# Patient Record
Sex: FEMALE | Race: BLACK OR AFRICAN AMERICAN | NOT HISPANIC OR LATINO | Employment: FULL TIME | URBAN - METROPOLITAN AREA
[De-identification: names, ages, dates, MRNs, and addresses within clinical notes are randomized per-mention and may not be internally consistent; named-entity substitution may affect disease eponyms.]

---

## 2018-07-18 ENCOUNTER — HOSPITAL ENCOUNTER (EMERGENCY)
Facility: HOSPITAL | Age: 28
Discharge: HOME | End: 2018-07-18
Attending: FAMILY MEDICINE
Payer: COMMERCIAL

## 2018-07-18 VITALS
WEIGHT: 253 LBS | OXYGEN SATURATION: 100 % | RESPIRATION RATE: 20 BRPM | SYSTOLIC BLOOD PRESSURE: 143 MMHG | TEMPERATURE: 98 F | DIASTOLIC BLOOD PRESSURE: 85 MMHG | HEIGHT: 65 IN | BODY MASS INDEX: 42.15 KG/M2 | HEART RATE: 62 BPM

## 2018-07-18 DIAGNOSIS — S69.90XA FINGER INJURY, INITIAL ENCOUNTER: Primary | ICD-10-CM

## 2018-07-18 DIAGNOSIS — S60.10XA SUBUNGUAL HEMATOMA OF FINGER, INITIAL ENCOUNTER: ICD-10-CM

## 2018-07-18 LAB — B-HCG UR QL: NEGATIVE

## 2018-07-18 PROCEDURE — 29130 APPL FINGER SPLINT STATIC: CPT | Mod: 59,F3 | Performed by: PHYSICIAN ASSISTANT

## 2018-07-18 PROCEDURE — 99284 EMERGENCY DEPT VISIT MOD MDM: CPT | Mod: 25 | Performed by: PHYSICIAN ASSISTANT

## 2018-07-18 PROCEDURE — 81025 URINE PREGNANCY TEST: CPT

## 2018-07-18 PROCEDURE — 96372 THER/PROPH/DIAG INJ SC/IM: CPT | Performed by: PHYSICIAN ASSISTANT

## 2018-07-18 PROCEDURE — 11740 EVACUATION SUBUNGUAL HMTMA: CPT | Mod: 59,F3 | Performed by: PHYSICIAN ASSISTANT

## 2018-07-18 RX ORDER — KETOROLAC TROMETHAMINE 30 MG/ML
15 INJECTION, SOLUTION INTRAMUSCULAR; INTRAVENOUS
Status: DISCONTINUED | OUTPATIENT
Start: 2018-07-18 | End: 2018-07-18 | Stop reason: HOSPADM

## 2018-07-18 NOTE — DISCHARGE INSTRUCTIONS
X-ray did not reveal any evidence of fracture or dislocation.  You are advised to follow-up with her primary care provider for reevaluation within 2 days.  You're instructed to return to the emergency department immediately for any new or worsening symptoms.

## 2018-07-19 NOTE — ED PROVIDER NOTES
Encounter Date: 7/18/2018       History     Chief Complaint   Patient presents with    Finger Injury     Pt reports slammed left ring finger in car door approx 45 min PTA, + bruising noted under nail bed, + swelling and pt c/o throbbing pain.     28-year-old female presents emergency department for evaluation of acute left ring finger pain status post injury.  She reports that approximately an hour and a half prior to arrival she slammed her left ring finger in her car door.  She states that she had to open the door in order to remove the finger.  She reports pain mild swelling noted to the tip of the finger.  She denies any numbness, tingling, weakness or decreased range of motion.  She reports  some bruising underneath her fingernail, but denies any bleeding from the nail.  No treatment was attempted prior to arrival.           Review of patient's allergies indicates:  No Known Allergies  History reviewed. No pertinent past medical history.  Past Surgical History:   Procedure Laterality Date    GASTRIC BYPASS       History reviewed. No pertinent family history.  Social History   Substance Use Topics    Smoking status: Never Smoker    Smokeless tobacco: Never Used    Alcohol use No     Review of Systems   Constitutional: Negative for activity change, appetite change and fever.   HENT: Negative for congestion, ear discharge, nosebleeds, postnasal drip, rhinorrhea, sinus pressure, sneezing, trouble swallowing and voice change.    Eyes: Negative for photophobia and visual disturbance.   Respiratory: Negative for cough and shortness of breath.    Cardiovascular: Negative for chest pain.   Gastrointestinal: Negative for abdominal pain, constipation, diarrhea, nausea and vomiting.   Genitourinary: Negative for dysuria and hematuria.   Musculoskeletal: Positive for joint swelling. Negative for back pain, gait problem and neck pain.   Skin: Negative for rash.   Neurological: Negative for dizziness, syncope, weakness,  numbness and headaches.       Physical Exam     Initial Vitals [07/18/18 1325]   BP Pulse Resp Temp SpO2   139/86 63 18 97.8 °F (36.6 °C) 99 %      MAP       --         Physical Exam    Nursing note and vitals reviewed.  Constitutional: She appears well-developed and well-nourished. She is not diaphoretic. No distress.   HENT:   Head: Normocephalic and atraumatic.   Right Ear: External ear normal.   Left Ear: External ear normal.   Nose: Nose normal.   Mouth/Throat: Oropharynx is clear and moist.   Eyes: Conjunctivae and EOM are normal. Pupils are equal, round, and reactive to light.   Neck: Normal range of motion. Neck supple.   Cardiovascular: Normal rate, regular rhythm and normal heart sounds. Exam reveals no gallop and no friction rub.    No murmur heard.  Pulmonary/Chest: Breath sounds normal. No respiratory distress. She has no wheezes. She has no rhonchi. She has no rales. She exhibits no tenderness.   Abdominal: Soft. Bowel sounds are normal. There is no tenderness.   Musculoskeletal:        Hands:  Lymphadenopathy:     She has no cervical adenopathy.   Neurological: She is alert and oriented to person, place, and time. No cranial nerve deficit.   Skin: Skin is warm and dry.   Psychiatric: She has a normal mood and affect.         ED Course   Procedures  Labs Reviewed   PREGNANCY TEST, URINE RAPID          Imaging Results          X-Ray Hand 3 view Left (Final result)  Result time 07/18/18 14:47:52    Final result by Chiki Mistry MD (07/18/18 14:47:52)                 Impression:      No acute fracture or dislocation.      Electronically signed by: Chiki Mistry MD  Date:    07/18/2018  Time:    14:47             Narrative:    EXAMINATION:  XR HAND COMPLETE 3 VIEW LEFT    CLINICAL HISTORY:  XR HAND COMPLETE 3 VIEW LEFT    FINDINGS:  Three views of the left hand were obtained.    No evidence of acute fracture or dislocation.  Bony mineralization is normal.  Soft tissues are unremarkable.                                  Medical Decision Making:   Initial Assessment:   28-year-old female presents for evaluation of finger injury.  Physical exam reveals a nontoxic-appearing female in no acute distress.  Patient is afebrile vital signs within normal limits.  Neurological exam reveals an alert and oriented patient.  No evidence of head injury noted.  Examination of the left upper extremity reveals Tenderness to palpation, mild edema and ecchymosis noted to the distal aspect of the left ring finger.  Small subungual hematoma noted.  Full range of motion, sensation and peripheral pulses intact in upper extremities bilaterally.  Differential Diagnosis:   X-ray order to assess possible osseous injury including fracture dislocation.  ED Management:  UPT negative.  Patient given Toradol for pain control.  X-ray reveals no evidence of acute fracture dislocation.  Trephination of the nail was performed in the emergency department using a cautery and relief of subungual  hematoma.   Patient tolerated the procedure well without complication.  the risks of nail deformity were discussed prior to trephination.  Finger was splinted for comfort.  Discussed these findings only for the patient who verbalizes understanding and agreement with course of treatment.  Instructed the patient to follow-up with her primary care provider for reevaluation and to return to the emergency department immediately for any new or worsening symptoms.                        Clinical Impression:   The primary encounter diagnosis was Finger injury, initial encounter. A diagnosis of Subungual hematoma of finger, initial encounter was also pertinent to this visit.                             Amy Bond PA-C  07/19/18 0574

## 2018-09-25 ENCOUNTER — HOSPITAL ENCOUNTER (EMERGENCY)
Facility: HOSPITAL | Age: 28
Discharge: HOME | End: 2018-09-25
Attending: EMERGENCY MEDICINE
Payer: COMMERCIAL

## 2018-09-25 VITALS
TEMPERATURE: 98 F | OXYGEN SATURATION: 100 % | WEIGHT: 220 LBS | HEART RATE: 70 BPM | HEIGHT: 65 IN | DIASTOLIC BLOOD PRESSURE: 80 MMHG | BODY MASS INDEX: 36.65 KG/M2 | RESPIRATION RATE: 18 BRPM | SYSTOLIC BLOOD PRESSURE: 125 MMHG

## 2018-09-25 DIAGNOSIS — L02.91 ABSCESS: Primary | ICD-10-CM

## 2018-09-25 LAB
B-HCG UR QL: NEGATIVE
CTP QC/QA: YES

## 2018-09-25 PROCEDURE — 81025 URINE PREGNANCY TEST: CPT | Performed by: PHYSICIAN ASSISTANT

## 2018-09-25 PROCEDURE — 25000003 PHARM REV CODE 250: Performed by: PHYSICIAN ASSISTANT

## 2018-09-25 PROCEDURE — 12002 RPR S/N/AX/GEN/TRNK2.6-7.5CM: CPT

## 2018-09-25 PROCEDURE — 99283 EMERGENCY DEPT VISIT LOW MDM: CPT | Mod: 25

## 2018-09-25 PROCEDURE — 10061 I&D ABSCESS COMP/MULTIPLE: CPT

## 2018-09-25 RX ORDER — SULFAMETHOXAZOLE AND TRIMETHOPRIM 800; 160 MG/1; MG/1
1 TABLET ORAL 2 TIMES DAILY
Qty: 14 TABLET | Refills: 0 | Status: ON HOLD | OUTPATIENT
Start: 2018-09-25 | End: 2018-10-02 | Stop reason: HOSPADM

## 2018-09-25 RX ORDER — HYDROCODONE BITARTRATE AND ACETAMINOPHEN 5; 325 MG/1; MG/1
1 TABLET ORAL
Status: COMPLETED | OUTPATIENT
Start: 2018-09-25 | End: 2018-09-25

## 2018-09-25 RX ORDER — LIDOCAINE HYDROCHLORIDE 10 MG/ML
10 INJECTION INFILTRATION; PERINEURAL
Status: COMPLETED | OUTPATIENT
Start: 2018-09-25 | End: 2018-09-25

## 2018-09-25 RX ORDER — HYDROCODONE BITARTRATE AND ACETAMINOPHEN 5; 325 MG/1; MG/1
1 TABLET ORAL EVERY 4 HOURS PRN
Qty: 6 TABLET | Refills: 0 | Status: SHIPPED | OUTPATIENT
Start: 2018-09-25 | End: 2018-09-27 | Stop reason: SDUPTHER

## 2018-09-25 RX ADMIN — LIDOCAINE HYDROCHLORIDE 10 ML: 10 INJECTION, SOLUTION INFILTRATION; PERINEURAL at 10:09

## 2018-09-25 RX ADMIN — HYDROCODONE BITARTRATE AND ACETAMINOPHEN 1 TABLET: 5; 325 TABLET ORAL at 10:09

## 2018-09-25 RX ADMIN — Medication: at 10:09

## 2018-09-25 NOTE — ED PROVIDER NOTES
Encounter Date: 9/25/2018       History     Chief Complaint   Patient presents with    Abscess     c/o bilateral axillary abscesses since yesterday with pain.     Eboni Betancourt, a 28 y.o. female that presents to the ED for evaluation for swelling and pain to bilateral axillary areas.  Patient states that she no drainage from the site.  Denies any fevers.  She states that she noticed some discomfort yesterday and that she has had an increase of pain since the onset of symptoms. She states she has got a remote history abscesses to the site about 10 years ago.  Treatments tried include warm compresses with little improvement of her pain.          The history is provided by the patient.     Review of patient's allergies indicates:  No Known Allergies  History reviewed. No pertinent past medical history.  Past Surgical History:   Procedure Laterality Date    GASTRIC BYPASS       History reviewed. No pertinent family history.  Social History     Tobacco Use    Smoking status: Never Smoker    Smokeless tobacco: Never Used   Substance Use Topics    Alcohol use: No    Drug use: No     Review of Systems   Constitutional: Negative for fever.   Gastrointestinal: Negative for nausea and vomiting.   Skin: Positive for color change.   Allergic/Immunologic: Negative for immunocompromised state.   Psychiatric/Behavioral: Negative for agitation.   All other systems reviewed and are negative.      Physical Exam     Initial Vitals [09/25/18 0924]   BP Pulse Resp Temp SpO2   132/82 74 16 97.5 °F (36.4 °C) 99 %      MAP       --         Physical Exam    Nursing note and vitals reviewed.  Constitutional: She appears well-developed and well-nourished. She is not diaphoretic. No distress.   HENT:   Head: Normocephalic and atraumatic.   Right Ear: External ear normal.   Left Ear: External ear normal.   Nose: Nose normal.   Mouth/Throat: Oropharynx is clear and moist.   Eyes: Conjunctivae and EOM are normal.   Neck: Normal range of  motion.   Cardiovascular: Normal rate and regular rhythm.   Pulmonary/Chest: Breath sounds normal. No respiratory distress. She has no wheezes. She has no rhonchi. She has no rales.   Musculoskeletal: Normal range of motion.   Neurological: She is alert and oriented to person, place, and time.   Skin: Skin is warm and dry. Capillary refill takes less than 2 seconds. Abscess noted.   1 cm x 1 cm area of induration to left mid axilla     2 cm x 2 cm area of induration to right lower axilla    1 cm x 1 cm area of induration to right upper axilla    Psychiatric: She has a normal mood and affect. Thought content normal.         ED Course   I & D - Incision and Drainage  Date/Time: 9/25/2018 2:58 PM  Location procedure was performed: [facility] EMERGENCY DEPARTMENT  Performed by: Cait Landis PA-C  Authorized by: Marlyn Ramos MD   Consent Done: Yes  Consent: Verbal consent obtained.  Risks and benefits: risks, benefits and alternatives were discussed  Consent given by: patient  Patient identity confirmed: verbally with patient  Type: abscess  Body area: upper extremity  Location details: right arm  Anesthesia: local infiltration    Anesthesia:  Local Anesthetic: LET (lido,epi,tetracaine) and lidocaine 1% without epinephrine  Anesthetic total: 2 mL  Patient sedated: no  Risk factor: underlying major vessel  Scalpel size: 11  Incision type: single straight  Complexity: simple  Drainage: pus  Drainage amount: scant  Wound treatment: incision,  wound left open,  deloculation and  drainage  Packing material: 1/4 in gauze  Complications: No  Specimens: No  Implants: No  Patient tolerance: Patient tolerated the procedure well with no immediate complications    I & D - Incision and Drainage  Date/Time: 9/25/2018 3:00 PM  Location procedure was performed: [facility] EMERGENCY DEPARTMENT  Performed by: Cait Landis PA-C  Authorized by: Marlyn Ramos MD   Consent Done: Yes  Consent: Verbal consent obtained.  Risks and  benefits: risks, benefits and alternatives were discussed  Consent given by: patient  Patient identity confirmed: verbally with patient  Type: abscess  Body area: upper extremity  Location details: right arm  Anesthesia: local infiltration    Anesthesia:  Local Anesthetic: lidocaine 1% with epinephrine and LET (lido,epi,tetracaine)  Anesthetic total: 2 mL  Scalpel size: 11  Incision type: single straight  Complexity: simple  Drainage: pus  Drainage amount: scant  Wound treatment: incision,  deloculation,  wound left open,  wound packed and  drainage  Packing material: 1/4 in gauze  Complications: No  Specimens: No  Implants: No  Patient tolerance: Patient tolerated the procedure well with no immediate complications    I & D - Incision and Drainage  Date/Time: 9/25/2018 3:01 PM  Location procedure was performed: [facility] EMERGENCY DEPARTMENT  Performed by: Cait Landis PA-C  Authorized by: Marlyn Ramos MD   Consent Done: Yes  Consent: Verbal consent obtained.  Risks and benefits: risks, benefits and alternatives were discussed  Consent given by: patient  Patient identity confirmed: verbally with patient  Type: abscess  Body area: upper extremity  Location details: left arm  Anesthesia: local infiltration    Anesthesia:  Local Anesthetic: lidocaine 1% without epinephrine and LET (lido,epi,tetracaine)  Anesthetic total: 2 mL  Patient sedated: no  Scalpel size: 11  Incision type: single straight  Complexity: simple  Drainage: pus  Drainage amount: scant  Wound treatment: incision,  wound left open,  deloculation,  drainage and  wound packed  Packing material: 1/4 in gauze  Complications: No  Specimens: No  Implants: No  Patient tolerance: Patient tolerated the procedure well with no immediate complications        Labs Reviewed - No data to display       Imaging Results    None          Medical Decision Making:   Initial Assessment:   Increased swelling and pain to bilateral axilla  Differential Diagnosis:    Abscess, cellulitis, folliculitis, hidradenitis suppurativa  ED Management:  Patient presents to ED for evaluation of increased swelling and pain to bilateral axilla.  Multiple areas of induration with some fluctuance noted. Bedside ultrasound performed by Dr. Ramos showed multiple fluid collections.  For patient's comfort, Norco and LET were given.  The 3 areas were incised and drained with production of purulent material.  All areas were deloculated and packed.  Patient will be prescribed a course Bactrim and Norco.  She was instructed to return in 2 days to have packing replaced.  She was given strict return precautions that include increased redness, swelling, drainage or fever.  Case discussed with supervising physician who agrees with plan.                Attending Attestation:     Physician Attestation Statement for NP/PA:   I have conducted a face to face encounter with this patient in addition to the NP/PA, due to NP/PA Request    Other NP/PA Attestation Additions:      Medical Decision Making: Patient presents to ED for evaluation of increased swelling and pain to bilateral axilla.  Multiple areas of induration with some fluctuance noted. Bedside ultrasound performed by me showed multiple fluid collections.  For patient's comfort, Norco and LET were given.  The 3 areas were incised and drained with production of purulent material.  All areas were deloculated and packed.  Patient will be prescribed a course Bactrim and Norco.  She was instructed to return in 2 days to have packing replaced.  She was given strict return precautions that include increased redness, swelling, drainage or fever.  Case discussed with supervising physician who agrees with plan.                    Clinical Impression:   The encounter diagnosis was Abscess.                             Cait Landis PA-C  09/25/18 1506       Marlyn Ramos MD  09/26/18 3426

## 2018-09-25 NOTE — ED NOTES
Pt presents to the ED c/o 2 abscesses under the left arm and 1 under the right arm/onset yesterday. Pain rated 10/10

## 2018-09-25 NOTE — DISCHARGE INSTRUCTIONS
You have been prescribed Norco for pain. Please do not take this medication while working, drinking alcohol, swimming, or while driving/operating heavy machinery. This medication may cause drowsiness, impair judgment, and reduce physical capabilities.

## 2018-09-25 NOTE — ED NOTES
APPEARANCE: Alert, oriented and in no acute distress.  CARDIAC: Normal rate and rhythm and pulse   PERIPHERAL VASCULAR: peripheral pulses present. Normal cap refill. No edema. Warm to touch.    RESPIRATORY:Normal rate and effort, breath sounds clear bilaterally throughout chest. Respirations are equal and unlabored no obvious signs of distress.  GASTRO: soft, bowel sounds normal, no tenderness, no abdominal distention.  MUSC: Full ROM. No bony tenderness or soft tissue tenderness. No obvious deformity.  SKIN: Underarm abscesses/ +redness and tenderness to the right and left underarms/no drainage noted   NEURO: 5/5 strength major flexors/extensors bilaterally. Sensory intact to light touch bilaterally. Clinton coma scale: eyes open spontaneously-4, oriented & converses-5, obeys commands-6. No neurological abnormalities.   MENTAL STATUS: awake, alert and aware of environment.  EYE: PERRL, both eyes: pupils brisk and reactive to light. Normal size.  ENT: EARS: no obvious drainage. NOSE: no active bleeding.

## 2018-09-27 ENCOUNTER — HOSPITAL ENCOUNTER (EMERGENCY)
Facility: HOSPITAL | Age: 28
Discharge: HOME | End: 2018-09-27
Attending: EMERGENCY MEDICINE
Payer: COMMERCIAL

## 2018-09-27 VITALS
OXYGEN SATURATION: 100 % | HEIGHT: 65 IN | HEART RATE: 62 BPM | TEMPERATURE: 99 F | RESPIRATION RATE: 18 BRPM | SYSTOLIC BLOOD PRESSURE: 125 MMHG | DIASTOLIC BLOOD PRESSURE: 69 MMHG | BODY MASS INDEX: 37.49 KG/M2 | WEIGHT: 225 LBS

## 2018-09-27 DIAGNOSIS — L02.412 ABSCESS OF LEFT AXILLA: Primary | ICD-10-CM

## 2018-09-27 DIAGNOSIS — L02.411 ABSCESS OF RIGHT AXILLA: ICD-10-CM

## 2018-09-27 DIAGNOSIS — L02.01 ABSCESS OF CHIN: ICD-10-CM

## 2018-09-27 PROCEDURE — 99283 EMERGENCY DEPT VISIT LOW MDM: CPT | Mod: 25

## 2018-09-27 PROCEDURE — 25000003 PHARM REV CODE 250: Performed by: EMERGENCY MEDICINE

## 2018-09-27 PROCEDURE — 10061 I&D ABSCESS COMP/MULTIPLE: CPT

## 2018-09-27 RX ORDER — HYDROCODONE BITARTRATE AND ACETAMINOPHEN 5; 325 MG/1; MG/1
1 TABLET ORAL EVERY 4 HOURS PRN
Qty: 12 TABLET | Refills: 0 | Status: ON HOLD | OUTPATIENT
Start: 2018-09-27 | End: 2018-10-02 | Stop reason: SDUPTHER

## 2018-09-27 RX ORDER — LIDOCAINE HYDROCHLORIDE 10 MG/ML
10 INJECTION INFILTRATION; PERINEURAL
Status: COMPLETED | OUTPATIENT
Start: 2018-09-27 | End: 2018-09-27

## 2018-09-27 RX ORDER — CLINDAMYCIN HYDROCHLORIDE 150 MG/1
300 CAPSULE ORAL 4 TIMES DAILY
Qty: 56 CAPSULE | Refills: 0 | Status: ON HOLD | OUTPATIENT
Start: 2018-09-27 | End: 2018-10-02 | Stop reason: HOSPADM

## 2018-09-27 RX ADMIN — LIDOCAINE HYDROCHLORIDE 10 ML: 10 INJECTION, SOLUTION INFILTRATION; PERINEURAL at 11:09

## 2018-09-27 NOTE — ED PROVIDER NOTES
Encounter Date: 9/27/2018    SCRIBE #1 NOTE: I, Hi Crane, am scribing for, and in the presence of,  Dr. oMnica Goss. I have scribed the entire note.       History     Chief Complaint   Patient presents with    Wound Check     Patient is in ED for wound check up and packing removal.  Patient states that she feels like she has a new abscess to chin area.      Eboni Betancourt is a 28 y.o. female who  has no past medical history on file.    The patient presents to the ED for wound check and packing removal. Patient reports she had I&D of an abscess on left axilla and 2 on right axilla about 2 days ago. States there is a new abscess under her chin. She denies any other complaints.      The history is provided by the patient.     Review of patient's allergies indicates:  No Known Allergies  History reviewed. No pertinent past medical history.  Past Surgical History:   Procedure Laterality Date    GASTRIC BYPASS       History reviewed. No pertinent family history.  Social History     Tobacco Use    Smoking status: Never Smoker    Smokeless tobacco: Never Used   Substance Use Topics    Alcohol use: No    Drug use: No     Review of Systems   Constitutional: Negative for chills and fever.   HENT: Negative for congestion, rhinorrhea and sore throat.    Eyes: Negative for redness and visual disturbance.   Respiratory: Negative for cough, shortness of breath and wheezing.    Cardiovascular: Negative for chest pain and palpitations.   Gastrointestinal: Negative for abdominal pain, diarrhea, nausea and vomiting.   Genitourinary: Negative for dysuria and hematuria.   Musculoskeletal: Negative for back pain, myalgias and neck pain.   Skin: Positive for wound. Negative for rash.        Abscess under chin.   Neurological: Negative for dizziness, weakness and light-headedness.   Psychiatric/Behavioral: Negative for confusion.   All other systems reviewed and are negative.      Physical Exam     Initial Vitals [09/27/18 1044]    BP Pulse Resp Temp SpO2   (!) 159/101 78 16 98.2 °F (36.8 °C) 98 %      MAP       --         Physical Exam    Nursing note and vitals reviewed.  Constitutional: She appears well-developed and well-nourished. No distress.   HENT:   Head: Normocephalic and atraumatic.   Mouth/Throat: Oropharynx is clear and moist.   Eyes: Conjunctivae and EOM are normal. Pupils are equal, round, and reactive to light.   Neck: Normal range of motion. Neck supple.   Musculoskeletal: Normal range of motion. She exhibits no edema or tenderness.   Neurological: She is alert and oriented to person, place, and time.   Skin: Skin is warm and dry. Abscess (2 abscess to the left axilla that have packing and are draining large amount of exudate, a single abscess to the right axilla with packing in place and draining large amount of exudate, abscess to the chin that has not been I&D'ed.) noted.   Psychiatric: She has a normal mood and affect. Her behavior is normal. Judgment and thought content normal.         ED Course   I & D - Incision and Drainage  Date/Time: 9/27/2018 1:37 PM  Location procedure was performed: [facility] EMERGENCY DEPARTMENT  Performed by: Monica Goss MD  Authorized by: Monica Goss MD   Consent Done: Emergent Situation  Type: abscess  Body area: upper extremity (chinbilateral axilla)  Anesthesia: local infiltration    Anesthesia:  Local Anesthetic: lidocaine 1% without epinephrine  Anesthetic total: 5 mL  Patient sedated: no  Description of findings: 2 abscesses to the right axilla and 1 abscess to the left axilla: packing removed and the abscesses were opened larger,deloculated and repacked with iodoform gauze.   Scalpel size: 11  Incision type: single straight  Complexity: simple  Drainage: pus  Drainage amount: moderate  Wound treatment: incision,  wound left open,  deloculation,  expression of material and  wound packed  Packing material: 1/4 in iodoform gauze  Complications: No  Specimens: No  Implants: No  Patient  tolerance: Patient tolerated the procedure well with no immediate complications    I & D - Incision and Drainage  Date/Time: 9/27/2018 2:08 PM  Performed by: Monica Goss MD  Authorized by: Monica Goss MD   Type: abscess  Body area: head/neck (submandibular chin)  Anesthesia: local infiltration    Anesthesia:  Local Anesthetic: lidocaine 1% without epinephrine  Anesthetic total: 3 mL  Patient sedated: no  Scalpel size: 11  Incision type: single straight  Complexity: simple  Drainage: pus  Drainage amount: scant  Wound treatment: incision,  wound left open,  deloculation and  expression of material  Packing material: 1/4 in iodoform gauze  Complications: No  Specimens: No  Implants: No  Patient tolerance: Patient tolerated the procedure well with no immediate complications        Labs Reviewed - No data to display       Imaging Results    None                               Clinical Impression:     1. Abscess of left axilla    2. Abscess of right axilla    3. Abscess of chin                  IMonica, personally performed the services described in this documentation. All medical record entries made by the scribe were at my direction and in my presence.  I have reviewed the chart and agree that the record reflects my personal performance and is accurate and complete. Monica Goss M.D. 2:09 PM09/27/2018                 Monica Goss MD  09/27/18 1404

## 2018-09-27 NOTE — ED NOTES
"Patient identifiers verified and correct for Eboni Betancourt.  Was in bed states," I was seen two days ago for two abscesses left axilla, one right. All were lanced and packed. I am here for removal of the packing and have the new one on my face opened. Dry clean dressing to areas previously mentioned. Rates the pain 9/10. Does have hair to right chin with swelling noted.    LOC: The patient is awake, alert and aware of environment with an appropriate affect, the patient is oriented x 3 and speaking appropriately.  APPEARANCE: Patient resting comfortably and in no acute distress, patient is clean and well groomed, patient's clothing is properly fastened.  SKIN: The skin is warm and dry, color consistent with ethnicity, patient has normal skin turgor and moist mucus membranes, skin intact, no breakdown or bruising noted. Swelling and irritated area right chin.  MUSCULOSKELETAL: Patient moving all extremities spontaneously, no obvious swelling or deformities noted. Arms ROM decreased due to pain.  RESPIRATORY: Airway is open and patent, respirations are spontaneous, patient has a normal effort and rate, no accessory muscle use noted, bilateral breath sounds clear  CARDIAC: Patient has a normal rate and regular rhythm, no periphreal edema noted, capillary refill < 3 seconds.  ABDOMEN: Soft and non tender to palpation, no distention noted, normoactive bowel sounds present in all four quadrants.  NEUROLOGIC: PERRL, 36mm bilaterally, eyes open spontaneously, behavior appropriate to situation, follows commands, facial expression symmetrical, bilateral hand grasp equal and even, purposeful motor response noted, normal sensation in all extremities when touched with a finger.    "

## 2018-09-30 ENCOUNTER — HOSPITAL ENCOUNTER (INPATIENT)
Facility: HOSPITAL | Age: 28
LOS: 2 days | Discharge: HOME | DRG: 603 | End: 2018-10-02
Attending: EMERGENCY MEDICINE | Admitting: INTERNAL MEDICINE
Payer: COMMERCIAL

## 2018-09-30 DIAGNOSIS — L03.211 CELLULITIS DIFFUSE, FACE: Primary | ICD-10-CM

## 2018-09-30 PROBLEM — E28.2 PCOS (POLYCYSTIC OVARIAN SYNDROME): Status: ACTIVE | Noted: 2018-09-30

## 2018-09-30 PROBLEM — Z98.84 HX OF GASTRIC BYPASS: Status: ACTIVE | Noted: 2018-09-30

## 2018-09-30 LAB
B-HCG UR QL: NEGATIVE
BASOPHILS # BLD AUTO: 0.03 K/UL
BASOPHILS NFR BLD: 0.3 %
BUN SERPL-MCNC: 4 MG/DL (ref 6–30)
CHLORIDE SERPL-SCNC: 101 MMOL/L (ref 95–110)
CREAT SERPL-MCNC: 0.7 MG/DL (ref 0.5–1.4)
CTP QC/QA: YES
DIFFERENTIAL METHOD: NORMAL
EOSINOPHIL # BLD AUTO: 0.1 K/UL
EOSINOPHIL NFR BLD: 0.7 %
ERYTHROCYTE [DISTWIDTH] IN BLOOD BY AUTOMATED COUNT: 12.4 %
GLUCOSE SERPL-MCNC: 88 MG/DL (ref 70–110)
HCT VFR BLD AUTO: 40.3 %
HCT VFR BLD CALC: 40 %PCV (ref 36–54)
HGB BLD-MCNC: 13.6 G/DL
IMM GRANULOCYTES # BLD AUTO: 0.04 K/UL
IMM GRANULOCYTES NFR BLD AUTO: 0.4 %
LYMPHOCYTES # BLD AUTO: 2.5 K/UL
LYMPHOCYTES NFR BLD: 25.1 %
MCH RBC QN AUTO: 30.7 PG
MCHC RBC AUTO-ENTMCNC: 33.7 G/DL
MCV RBC AUTO: 91 FL
MONOCYTES # BLD AUTO: 1 K/UL
MONOCYTES NFR BLD: 9.6 %
NEUTROPHILS # BLD AUTO: 6.4 K/UL
NEUTROPHILS NFR BLD: 63.9 %
NRBC BLD-RTO: 0 /100 WBC
PLATELET # BLD AUTO: 262 K/UL
PMV BLD AUTO: 10.6 FL
POC IONIZED CALCIUM: 1.16 MMOL/L (ref 1.06–1.42)
POC TCO2 (MEASURED): 27 MMOL/L (ref 23–29)
POTASSIUM BLD-SCNC: 3.6 MMOL/L (ref 3.5–5.1)
RBC # BLD AUTO: 4.43 M/UL
SAMPLE: ABNORMAL
SODIUM BLD-SCNC: 140 MMOL/L (ref 136–145)
WBC # BLD AUTO: 10.07 K/UL

## 2018-09-30 PROCEDURE — 25000003 PHARM REV CODE 250: Performed by: INTERNAL MEDICINE

## 2018-09-30 PROCEDURE — 25000003 PHARM REV CODE 250: Performed by: NURSE PRACTITIONER

## 2018-09-30 PROCEDURE — 87077 CULTURE AEROBIC IDENTIFY: CPT

## 2018-09-30 PROCEDURE — 90715 TDAP VACCINE 7 YRS/> IM: CPT | Performed by: NURSE PRACTITIONER

## 2018-09-30 PROCEDURE — 87070 CULTURE OTHR SPECIMN AEROBIC: CPT

## 2018-09-30 PROCEDURE — 11000001 HC ACUTE MED/SURG PRIVATE ROOM

## 2018-09-30 PROCEDURE — 90471 IMMUNIZATION ADMIN: CPT | Performed by: NURSE PRACTITIONER

## 2018-09-30 PROCEDURE — 25000003 PHARM REV CODE 250: Performed by: EMERGENCY MEDICINE

## 2018-09-30 PROCEDURE — 10060 I&D ABSCESS SIMPLE/SINGLE: CPT | Mod: LT,,, | Performed by: EMERGENCY MEDICINE

## 2018-09-30 PROCEDURE — 99223 1ST HOSP IP/OBS HIGH 75: CPT | Mod: ,,, | Performed by: INTERNAL MEDICINE

## 2018-09-30 PROCEDURE — 10061 I&D ABSCESS COMP/MULTIPLE: CPT

## 2018-09-30 PROCEDURE — 85025 COMPLETE CBC W/AUTO DIFF WBC: CPT

## 2018-09-30 PROCEDURE — 96365 THER/PROPH/DIAG IV INF INIT: CPT

## 2018-09-30 PROCEDURE — 87040 BLOOD CULTURE FOR BACTERIA: CPT

## 2018-09-30 PROCEDURE — 99283 EMERGENCY DEPT VISIT LOW MDM: CPT | Mod: 25,,, | Performed by: EMERGENCY MEDICINE

## 2018-09-30 PROCEDURE — 0X953ZZ DRAINAGE OF LEFT AXILLA, PERCUTANEOUS APPROACH: ICD-10-PCS | Performed by: EMERGENCY MEDICINE

## 2018-09-30 PROCEDURE — 96366 THER/PROPH/DIAG IV INF ADDON: CPT

## 2018-09-30 PROCEDURE — 63600175 PHARM REV CODE 636 W HCPCS: Performed by: NURSE PRACTITIONER

## 2018-09-30 PROCEDURE — 87186 SC STD MICRODIL/AGAR DIL: CPT

## 2018-09-30 PROCEDURE — 25000003 PHARM REV CODE 250: Performed by: STUDENT IN AN ORGANIZED HEALTH CARE EDUCATION/TRAINING PROGRAM

## 2018-09-30 PROCEDURE — 81025 URINE PREGNANCY TEST: CPT | Performed by: EMERGENCY MEDICINE

## 2018-09-30 PROCEDURE — 99285 EMERGENCY DEPT VISIT HI MDM: CPT | Mod: 25

## 2018-09-30 RX ORDER — ACETAMINOPHEN 325 MG/1
650 TABLET ORAL EVERY 4 HOURS PRN
Status: DISCONTINUED | OUTPATIENT
Start: 2018-09-30 | End: 2018-10-03 | Stop reason: HOSPADM

## 2018-09-30 RX ORDER — VANCOMYCIN HCL IN 5 % DEXTROSE 1.5G/250ML
15 PLASTIC BAG, INJECTION (ML) INTRAVENOUS
Status: COMPLETED | OUTPATIENT
Start: 2018-09-30 | End: 2018-09-30

## 2018-09-30 RX ORDER — SODIUM CHLORIDE 0.9 % (FLUSH) 0.9 %
5 SYRINGE (ML) INJECTION
Status: DISCONTINUED | OUTPATIENT
Start: 2018-09-30 | End: 2018-10-03 | Stop reason: HOSPADM

## 2018-09-30 RX ORDER — ONDANSETRON 8 MG/1
8 TABLET, ORALLY DISINTEGRATING ORAL EVERY 8 HOURS PRN
Status: DISCONTINUED | OUTPATIENT
Start: 2018-09-30 | End: 2018-10-03 | Stop reason: HOSPADM

## 2018-09-30 RX ORDER — HYDROCODONE BITARTRATE AND ACETAMINOPHEN 5; 325 MG/1; MG/1
1 TABLET ORAL EVERY 6 HOURS PRN
Status: DISCONTINUED | OUTPATIENT
Start: 2018-09-30 | End: 2018-10-03 | Stop reason: HOSPADM

## 2018-09-30 RX ORDER — VANCOMYCIN HCL IN 5 % DEXTROSE 1.5G/250ML
1500 PLASTIC BAG, INJECTION (ML) INTRAVENOUS
Status: DISCONTINUED | OUTPATIENT
Start: 2018-10-01 | End: 2018-09-30

## 2018-09-30 RX ORDER — VANCOMYCIN HCL IN 5 % DEXTROSE 1.5G/250ML
1500 PLASTIC BAG, INJECTION (ML) INTRAVENOUS
Status: DISCONTINUED | OUTPATIENT
Start: 2018-10-01 | End: 2018-10-02

## 2018-09-30 RX ORDER — GLUCAGON 1 MG
1 KIT INJECTION
Status: DISCONTINUED | OUTPATIENT
Start: 2018-09-30 | End: 2018-10-03 | Stop reason: HOSPADM

## 2018-09-30 RX ORDER — PROMETHAZINE HYDROCHLORIDE 25 MG/1
25 TABLET ORAL EVERY 6 HOURS PRN
Status: DISCONTINUED | OUTPATIENT
Start: 2018-09-30 | End: 2018-10-03 | Stop reason: HOSPADM

## 2018-09-30 RX ORDER — IBUPROFEN 200 MG
24 TABLET ORAL
Status: DISCONTINUED | OUTPATIENT
Start: 2018-09-30 | End: 2018-10-03 | Stop reason: HOSPADM

## 2018-09-30 RX ORDER — LIDOCAINE HYDROCHLORIDE 10 MG/ML
10 INJECTION INFILTRATION; PERINEURAL ONCE
Status: COMPLETED | OUTPATIENT
Start: 2018-09-30 | End: 2018-09-30

## 2018-09-30 RX ORDER — RAMELTEON 8 MG/1
8 TABLET ORAL NIGHTLY PRN
Status: DISCONTINUED | OUTPATIENT
Start: 2018-09-30 | End: 2018-10-03 | Stop reason: HOSPADM

## 2018-09-30 RX ORDER — ENOXAPARIN SODIUM 100 MG/ML
40 INJECTION SUBCUTANEOUS EVERY 24 HOURS
Status: DISCONTINUED | OUTPATIENT
Start: 2018-09-30 | End: 2018-10-03 | Stop reason: HOSPADM

## 2018-09-30 RX ORDER — IBUPROFEN 200 MG
16 TABLET ORAL
Status: DISCONTINUED | OUTPATIENT
Start: 2018-09-30 | End: 2018-10-03 | Stop reason: HOSPADM

## 2018-09-30 RX ORDER — LIDOCAINE HYDROCHLORIDE AND EPINEPHRINE 10; 10 MG/ML; UG/ML
10 INJECTION, SOLUTION INFILTRATION; PERINEURAL ONCE
Status: COMPLETED | OUTPATIENT
Start: 2018-09-30 | End: 2018-09-30

## 2018-09-30 RX ADMIN — VANCOMYCIN HYDROCHLORIDE 1500 MG: 10 INJECTION, POWDER, LYOPHILIZED, FOR SOLUTION INTRAVENOUS at 02:09

## 2018-09-30 RX ADMIN — LIDOCAINE HYDROCHLORIDE AND EPINEPHRINE 10 ML: 10; 10 INJECTION, SOLUTION INFILTRATION; PERINEURAL at 06:09

## 2018-09-30 RX ADMIN — CLOSTRIDIUM TETANI TOXOID ANTIGEN (FORMALDEHYDE INACTIVATED), CORYNEBACTERIUM DIPHTHERIAE TOXOID ANTIGEN (FORMALDEHYDE INACTIVATED), BORDETELLA PERTUSSIS TOXOID ANTIGEN (GLUTARALDEHYDE INACTIVATED), BORDETELLA PERTUSSIS FILAMENTOUS HEMAGGLUTININ ANTIGEN (FORMALDEHYDE INACTIVATED), BORDETELLA PERTUSSIS PERTACTIN ANTIGEN, AND BORDETELLA PERTUSSIS FIMBRIAE 2/3 ANTIGEN 0.5 ML: 5; 2; 2.5; 5; 3; 5 INJECTION, SUSPENSION INTRAMUSCULAR at 05:09

## 2018-09-30 RX ADMIN — HYDROCODONE BITARTRATE AND ACETAMINOPHEN 1 TABLET: 5; 325 TABLET ORAL at 06:09

## 2018-09-30 RX ADMIN — LIDOCAINE HYDROCHLORIDE 10 ML: 10 INJECTION, SOLUTION INFILTRATION; PERINEURAL at 01:09

## 2018-09-30 NOTE — ASSESSMENT & PLAN NOTE
27 y/o with chin/submental cellulitis s/p I&D at [facility] and needle aspiration without the return of pus    -continue care per primary team   -continue vancomycin   -monitor clinically, may need CT w/ contrast if no improvement   -okay for diet from ENT perspective   -ENT will follow, call with questions

## 2018-09-30 NOTE — SUBJECTIVE & OBJECTIVE
Medications:  Continuous Infusions:  Scheduled Meds:   enoxaparin  40 mg Subcutaneous Daily    [COMPLETED] lidocaine-EPINEPHrine 1%-1:100,000  10 mL Intradermal Once    tetanus and diphther. tox (PF)  0.5 mL Intramuscular ED 1 Time    [START ON 10/1/2018] vancomycin (VANCOCIN) IVPB  1,500 mg Intravenous Q12H     PRN Meds:acetaminophen, dextrose 50%, dextrose 50%, glucagon (human recombinant), glucose, glucose, HYDROcodone-acetaminophen, ondansetron, promethazine, ramelteon, sodium chloride 0.9%     No current facility-administered medications on file prior to encounter.      Current Outpatient Medications on File Prior to Encounter   Medication Sig    clindamycin (CLEOCIN) 150 MG capsule Take 2 capsules (300 mg total) by mouth 4 (four) times daily. for 7 days    HYDROcodone-acetaminophen (NORCO) 5-325 mg per tablet Take 1 tablet by mouth every 4 (four) hours as needed for Pain.    sulfamethoxazole-trimethoprim 800-160mg (BACTRIM DS) 800-160 mg Tab Take 1 tablet by mouth 2 (two) times daily. for 7 days       Review of patient's allergies indicates:  No Known Allergies    Past Medical History:   Diagnosis Date    Abscess     Polycystic ovarian disease      Past Surgical History:   Procedure Laterality Date    GASTRIC BYPASS      GASTRIC BYPASS       Family History     None        Tobacco Use    Smoking status: Never Smoker    Smokeless tobacco: Never Used   Substance and Sexual Activity    Alcohol use: No    Drug use: No    Sexual activity: Not on file     Review of Systems   Constitutional: Negative for chills and fever.   HENT: Positive for facial swelling. Negative for congestion, dental problem, ear pain, sore throat, trouble swallowing and voice change.    Eyes: Negative for visual disturbance. Eye discharge: since gastric bypass.   Respiratory: Negative for shortness of breath and stridor.    Cardiovascular: Negative for chest pain.   Gastrointestinal: Negative for abdominal pain, nausea and  vomiting.   Endocrine: Positive for cold intolerance.   Genitourinary: Negative for difficulty urinating.   Musculoskeletal: Negative for neck pain.   Skin: Positive for wound.   Allergic/Immunologic: Negative for immunocompromised state.   Neurological: Negative for dizziness and headaches.   Hematological: Does not bruise/bleed easily.   Psychiatric/Behavioral: Negative for decreased concentration and dysphoric mood. The patient is not nervous/anxious.      Objective:     Vital Signs (Most Recent):  Temp: 98.6 °F (37 °C) (09/30/18 1135)  Pulse: 71 (09/30/18 1135)  Resp: 16 (09/30/18 1135)  BP: (!) 144/90 (09/30/18 1135)  SpO2: 99 % (09/30/18 1135) Vital Signs (24h Range):  Temp:  [98.6 °F (37 °C)] 98.6 °F (37 °C)  Pulse:  [71] 71  Resp:  [16] 16  SpO2:  [99 %] 99 %  BP: (144)/(90) 144/90     Weight: 102.1 kg (225 lb)  Body mass index is 37.44 kg/m².         Physical Exam   Constitutional: Well appearing / communicating.  NAD.  Eyes: EOM I Bilaterally  Head/Face: Normocephalic.  Negative paranasal sinus pressure/tenderness.  Salivary glands WNL.  House Brackmann I Bilaterally. Right chin swelling and induration with possible fluctuance of left chin.   Right Ear: External Auditory Canal WNL.  Auricle WNL.  Left Ear: External Auditory Canal WNL. Auricle WNL.  Nose: No gross nasal septal deviation. Inferior Turbinates 2+ bilaterally. No septal perforation. No masses/lesions. External nasal skin without masses/lesions.  Oral Cavity: Gingiva/lips WNL.  FOM Soft, no masses palpated. Oral Tongue mobile. Hard Palate WNL.   Oropharynx: Tonsillar fossa/pharyngeal wall without lesions. Posterior oropharynx WNL.  Soft palate without masses. Midline uvula.   Neck/Lymphatic: No LAD I-VI bilaterally.  No thyromegaly. Right submental swelling with 1-2cm area of discrete induration with small 4mm cut, no purulence able to be expressed, multiple excoriations/abrasions over submental area with short hairs   Neuro/Psychiatric: AOx3.   Normal mood and affect.   Respiratory: Normal respiratory effort, no stridor, no retractions noted.    Procedure:   After verbal consent was obtained outlining the risks, benefits, and alternatives, needle aspiration procedure was began. 3 cc of 1% lidocaine with 1:100K epinephrine was injected into left lower lip vestibule mucosa. An 18G needle was used to aspirate multiple areas of questionable flutuance without the return of pus. The patient tolerated the procedure well.       Significant Labs:  CBC:   Recent Labs   Lab  09/30/18   1301  09/30/18   1306   WBC  10.07   --    RBC  4.43   --    HGB  13.6   --    HCT  40.3  40   PLT  262   --    MCV  91   --    MCH  30.7   --    MCHC  33.7   --      CMP: No results for input(s): GLU, CALCIUM, ALBUMIN, PROT, NA, K, CO2, CL, BUN, CREATININE, ALKPHOS, ALT, AST, BILITOT in the last 168 hours.    Significant Diagnostics:  CT: I have reviewed all pertinent results/findings within the past 24 hours and my personal findings are:  evaluation limited without contrast, no definitive abscess identified, cellulitis

## 2018-09-30 NOTE — LETTER
October 2, 2018             [facility]  [address]  [place]  [address]  Phone: [phone]  Fax: [phone] October 2, 2018     Patient: Eboni Betancourt   YOB: 1990       To Whom It May Concern:    Eboni Betancourt was admitted to the hospital on 9/30/2018 11:35 AM and discharged on  10/2/2018.  She was on opioid medications this hospital stay and will be receiving some small amount of opioid pain medication for home which may lead to positive opioid on drug test at this time.  If you have any questions or concerns, please don't hesitate to call my office at [phone].        Sincerely,          Tristan Kelly MD  Department of Hospital Medicine

## 2018-09-30 NOTE — H&P
[facility]  History and Physical Exam    Team: [facility] TOM Woodruff MD  Admit Date: 9/30/2018  Principal Problem:  Facial cellulitis   Patient information was obtained from patient and ER records.   Primary care Physician: Primary Doctor No  Code status: Full Code    HPI:   Eboni Betancourt is a 28 y.o. female with past medical history of PCOS and gastric bypass surgery who presents with facial and axillary abscesses for the last 1 week.  Patient notes that she has been to the ER twice for the same.  She has had I&D's both times.  She notes that initially started out as axillary abscesses which she had successful I&D and was prescribed oral Bactrim.  She notes that she went back for a wound check in the ER and had a new chin abscess develop at that time.  She underwent I&D of this and she was given a course of clindamycin.  Patient notes the abscesses worsened and she developed a new one under her chin.  The one under her chin is now so painful and it makes her mouth swollen and hard to swallow.  Patient denies any recent fevers or chills.  She does notes plucking a chin hair recently.  She denies any other sores or open lesions.  She denies any history of abscesses in the past.  She denies any nausea, vomiting or diarrhea.  Patient does note that her cousin had some sort of syndrome with multiple abscesses under her breast.  She denies any of other family history of abscesses.    In the ED, she was given an dose of IV vancomycin.  She underwent CT maxillofacial without contrast which did not show any definitive abscess.  WBC count was normal.  Admission was requested for facial cellulitis.    No results found for: HGBA1C    Past Medical History: Patient has a past medical history of Abscess and Polycystic ovarian disease.    Past Surgical History: Patient has a past surgical history that includes Gastric bypass and Gastric bypass.    Social History: Patient reports that  has never smoked. she has  never used smokeless tobacco. She reports that she does not drink alcohol or use drugs.    Family History: family history is not on file.    Medications: reviewed     Allergies: Patient has No Known Allergies.    ROS  Constitutional: Negative for chills, fatigue, fever.   HENT: Negative for sore throat, trouble swallowing.    Eyes: Negative for photophobia, visual disturbance.   Respiratory: Negative for cough, shortness of breath.    Cardiovascular: Negative for chest pain, palpitations, leg swelling.   Gastrointestinal: Negative for abdominal pain, constipation, diarrhea, nausea, vomiting.   Endocrine: Negative for cold intolerance, heat intolerance.   Genitourinary: Negative for dysuria, frequency.   Musculoskeletal: Negative for arthralgias, myalgias.   Skin: Negative for  wound, +rash,erythema   Neurological: Negative for dizziness, syncope, weakness, light-headedness.   Psychiatric/Behavioral: Negative for confusion, hallucinations, anxiety  All other systems reviewed and are negative.    PEx  Temp:  [98.6 °F (37 °C)]   Pulse:  [71]   Resp:  [16]   BP: (144)/(90)   SpO2:  [99 %]   Body mass index is 37.44 kg/m².   No intake or output data in the 24 hours ending 09/30/18 1533      General appearance: no distress, alert and oriented x 3  HEENT:  conjunctivae/corneas clear, PERRL, mucous membranes moist   Neck: supple, thyroid not enlarged  Pulm:   normal respiratory effort, CTAB, no wheezes, rales or rhonchi  Card: RRR, S1, S2 normal, no murmur, click, rub or gallop  Abd: soft, NT, ND, BS present; no masses, no organomegaly  Ext: no c/c/e   Pulses: 2+, symmetric  Skin:  2 large areas of erythema and edema noted under the chin, tender to palpation, the one more superiorly does have a central punctum, fluctuance noted, multiple erythematous areas noted under the axilla, few open lesions from previous incision and drainage, no fluctuance noted, mild purulent discharge  Neuro: CN II-XII grossly intact, no focal  numbness or weakness, normal strength and tone   Psych: normal mood and affect    Recent Results (from the past 24 hour(s))   POCT urine pregnancy    Collection Time: 09/30/18  1:00 PM   Result Value Ref Range    POC Preg Test, Ur Negative Negative     Acceptable Yes    CBC auto differential    Collection Time: 09/30/18  1:01 PM   Result Value Ref Range    WBC 10.07 3.90 - 12.70 K/uL    RBC 4.43 4.00 - 5.40 M/uL    Hemoglobin 13.6 12.0 - 16.0 g/dL    Hematocrit 40.3 37.0 - 48.5 %    MCV 91 82 - 98 fL    MCH 30.7 27.0 - 31.0 pg    MCHC 33.7 32.0 - 36.0 g/dL    RDW 12.4 11.5 - 14.5 %    Platelets 262 150 - 350 K/uL    MPV 10.6 9.2 - 12.9 fL    Immature Granulocytes 0.4 0.0 - 0.5 %    Gran # (ANC) 6.4 1.8 - 7.7 K/uL    Immature Grans (Abs) 0.04 0.00 - 0.04 K/uL    Lymph # 2.5 1.0 - 4.8 K/uL    Mono # 1.0 0.3 - 1.0 K/uL    Eos # 0.1 0.0 - 0.5 K/uL    Baso # 0.03 0.00 - 0.20 K/uL    nRBC 0 0 /100 WBC    Gran% 63.9 38.0 - 73.0 %    Lymph% 25.1 18.0 - 48.0 %    Mono% 9.6 4.0 - 15.0 %    Eosinophil% 0.7 0.0 - 8.0 %    Basophil% 0.3 0.0 - 1.9 %    Differential Method Automated    ISTAT PROCEDURE    Collection Time: 09/30/18  1:06 PM   Result Value Ref Range    POC Glucose 88 70 - 110 mg/dL    POC BUN 4 (L) 6 - 30 mg/dL    POC Creatinine 0.7 0.5 - 1.4 mg/dL    POC Sodium 140 136 - 145 mmol/L    POC Potassium 3.6 3.5 - 5.1 mmol/L    POC Chloride 101 95 - 110 mmol/L    POC TCO2 (MEASURED) 27 23 - 29 mmol/L    POC Ionized Calcium 1.16 1.06 - 1.42 mmol/L    POC Hematocrit 40 36 - 54 %PCV    Sample JAS        No results for input(s): POCTGLUCOSE in the last 168 hours.    Active Hospital Problems    Diagnosis  POA    *Facial cellulitis [L03.211]  Unknown    PCOS (polycystic ovarian syndrome) [E28.2]  Unknown    Hx of gastric bypass [Z98.84]  Not Applicable      Resolved Hospital Problems   No resolved problems to display.       IMAGING:   CT maxillofacial  FINDINGS:  Skull skullbase temporal bones and orbits are  unremarkable.  No bone destruction seen.  Is nasal bones and nasal spines are intact.  There are multiple prominent submandibular and neck lymph nodes.  There is soft tissue swelling and edema around and beneath the chin.  No dental lesions are seen.  No abscess is seen.  No intracranial abnormality seen.  Orbits are intact.      Impression       See above    Cellulitis anterior and inferior to the mandible with reactive lymph nodes as above.  Is         EKG: none performed      Assessment and Plan:  1. Facial cellulitis  - given dose of vanco in ED, will continue  - I&D performed  - CT maxillofacial shows no abscess but not done with contrast   - warm compresses  - blood cx x 2  - aerobic cx pending  - monitor CBC  - consult ENT for possible I&D, appreciate recs    2. Axillary abscesses  - s/p I&D  - concern for hidradenitis  - warm compresses  - wound care consult     3. PCOS  - currently not on medication     4. Hx gastric bypass      Diet: regular  DVT PPx: SCDs  GI prophylaxis: n/a  Code status: FULL    Dispo: admit for IV abx, consult ENT for possible I&D, appreciate recs, follow up blood cultures    Leyla Woodruff MD  Hospital Medicine Staff  [phone] pager

## 2018-09-30 NOTE — HPI
Ms. Betancourt is a 29 y/o female with PMH of PCOS and gastric bypass surgery who presents to the ED with concern for facial and axillary abscesses. ENT was consulted for facial abscess. She reports that she had her axillary abscesses drained at an OSH Tuesday, but she returned to the hospital Thursday with facial swelling and a submental abscess was drained. She reports pus was expressed. She was prescribed Bactrim Tuesday and Clindamycin Thursday and told to take both. She has not noted any improvement in symptoms and by today she had new right chin swelling concerning for abscess. She denies any fevers at home. She denies recent dental procedures. She reports plucking chin/submental hairs and picking at them constantly. She has never had abscesses in the past. She denies trouble breathing, dysphagia or dysphonia.

## 2018-09-30 NOTE — ED TRIAGE NOTES
Patient identifiers for Eboni Betancourt 28 y.o. female checked and correct. Patient presents to the ED for re- evaluation of abscesses to chin, and bilateral armpits. She reports that she has an I and D performed on 2 abscess to left armpit and 1 abscess to right armpit on [facility] last Tuesday. When she returned for a follow- up on Thursday, she had a repeat I and D to bilateral armpits and a new abscess to the chin. She reports that the drainage form all sites have not decreased and that she feels that her lower gums are starting to swell up as well. She denies fevers and chills.    Patient sitting up in exam table, appears to be resting comfortably and in no acute distress. Patient is clean and well groomed and clothing is properly fastened.    NEUROLOGICAL: The patient is awake, alert and oriented to person, time, place, and situation and speaking clearly and appropriately. Eyes open spontaneously, behavior appropriate to situation, follows commands, facial expression symmetrical, purposeful motor response noted, normal sensation in all extremities when touched with a finger.  CARDIOVASCULAR:  Pulses intact. No peripheral edema noted, capillary refill < 3 seconds.   RESPIRATORY: Airway is open, respirations are spontaneous, patient has a normal effort and rate, no accessory muscle use noted.   GASTROINTESTINAL: Abdomen is soft and non-tender to palpation, no distention noted  GENITOURINARY: Patient voids spontaneously without difficulty. Patient is continent.   MUSCULOSKELETAL: Patient moving all extremities spontaneously, no obvious swelling or deformities noted.   INTEGUMENTARY: The skin is warm and dry, color consistent with ethnicity, patient has normal skin turgor and moist mucus membranes. Dressings are dry and intact to the chin and bilateral armpits. SHe reports that she changed the dressings last night.  PSYCHOSOCIAL: Calm, pleasant, and cooperative. Family present at bedside.

## 2018-09-30 NOTE — ED PROVIDER NOTES
"            Encounter Date: 9/30/2018       History     Chief Complaint   Patient presents with    Abscess     abscesses in both axillary area.and chin.  Had them I and D'd at [facility] . Now her face is swelling.     28 year female with a PMHx of PCOS and gastric bypass (3/2018) presents to the ED with family member for abscess in axillary areas bilaterally and chin for x6 days.  Pt states she woke up this morning with facial swelling.  Pt has been able to swallow medications with no difficulty, but "hasn't been easy."  She has had an incision and drainage performed at [facility] last Tuesday, which the drainage did not decrease.  On Thursday, she developed a new abscess under her chin, had it incised at [facility] and packed.  Pt states she is currently taking Clindamycin and Bactrim with no improvement.  Pt states she had an episode similar to this 10 years ago, but not this severe.  Tetanus status is unknown.  Denies chills, fever, injury, no hx or exposure to MRSA, insect bites, palpatiations, CP, SOB, wheezing, abdominal pain or n/v/d.                Review of patient's allergies indicates:  No Known Allergies  Past Medical History:   Diagnosis Date    Abscess     Polycystic ovarian disease      Past Surgical History:   Procedure Laterality Date    GASTRIC BYPASS      GASTRIC BYPASS       History reviewed. No pertinent family history.  Social History     Tobacco Use    Smoking status: Never Smoker    Smokeless tobacco: Never Used   Substance Use Topics    Alcohol use: No    Drug use: No     Review of Systems   Constitutional: Negative for fever.   HENT: Negative for sore throat.    Respiratory: Negative for shortness of breath.    Cardiovascular: Negative for chest pain.   Gastrointestinal: Negative for nausea.   Genitourinary: Negative for dysuria.   Musculoskeletal: Negative for back pain.   Skin: Positive for wound. Negative for color change and rash.        Pt states, "I have abscess " "under my arms and chin."   Neurological: Negative for weakness.   Hematological: Does not bruise/bleed easily.       Physical Exam     Initial Vitals [09/30/18 1135]   BP Pulse Resp Temp SpO2   (!) 144/90 71 16 98.6 °F (37 °C) 99 %      MAP       --         Physical Exam    Nursing note and vitals reviewed.  Constitutional: She appears well-developed and well-nourished. She is not diaphoretic. No distress.   HENT:   Head: Normocephalic and atraumatic.   Neck: Normal range of motion.   Cardiovascular: Normal rate, regular rhythm, normal heart sounds and intact distal pulses.   Abdominal: Soft. Bowel sounds are normal. There is no tenderness.   Neurological: She is alert and oriented to person, place, and time.   Skin: Skin is warm and dry. Capillary refill takes less than 2 seconds. Abscess noted.   Localized pain to left axillary area; two 2 cm incision observed, fluctuance observed with mild erythema and irritation noted.  Mild serosanguinous drainage.  No pain to the right axillary area.  One 2-3 cm laceration observed.  Mild erythema and irritation noted.         Psychiatric: She has a normal mood and affect.         ED Course   I & D - Incision and Drainage  Date/Time: 9/30/2018 5:13 PM  Performed by: Vinny De Paz III, NP  Authorized by: Lucy Cruz MD   Type: abscess  Body area: trunk (Left axillary area)  Anesthesia: local infiltration    Anesthesia:  Local Anesthetic: lidocaine 1% without epinephrine  Anesthetic total: 1 mL  Patient sedated: no  Drainage: pus  Drainage amount: scant  Patient tolerance: Patient tolerated the procedure well with no immediate complications  Comments: Abscess puncuated with 18 gauge needle.         Labs Reviewed   ISTAT PROCEDURE - Abnormal; Notable for the following components:       Result Value    POC BUN 4 (*)     All other components within normal limits   CULTURE, AEROBIC  (SPECIFY SOURCE)   CULTURE, BLOOD   CBC W/ AUTO DIFFERENTIAL   POCT URINE PREGNANCY "   ISTAT CHEM8          Imaging Results          CT Maxillofacial Without Contrast (Final result)  Result time 09/30/18 13:25:52    Final result by Donaldo Flores III, MD (09/30/18 13:25:52)                 Impression:      See above    Cellulitis anterior and inferior to the mandible with reactive lymph nodes as above.  Is      Electronically signed by: Donaldo Flores MD  Date:    09/30/2018  Time:    13:25             Narrative:    EXAMINATION:  CT MAXILLOFACIAL WITHOUT CONTRAST    CLINICAL HISTORY:  Mass, lump or swelling, maxface;chin abscess;    FINDINGS:  Skull skullbase temporal bones and orbits are unremarkable.  No bone destruction seen.  Is nasal bones and nasal spines are intact.  There are multiple prominent submandibular and neck lymph nodes.  There is soft tissue swelling and edema around and beneath the chin.  No dental lesions are seen.  No abscess is seen.  No intracranial abnormality seen.  Orbits are intact.                                       APC / Resident Notes:   28 year female with a PMHx of PCOS and gastric bypass (3/2018) presents to the ED with family member for abscess in axillary areas bilaterally and chin for x6 days.  VSS.  Afebrile.  Alert, sitting in no distress.  Localized pain to left axillary area; two 2 cm laceration observed, fluctuance observed with mild erythema and irritation noted.  Mild serosanguinous drainage.  No pain to the right axillary area.  One 2-3 cm laceration observed.  Mild erythema and irritation noted.  One 2 cm laceration noted on the chin.  No erythema present or warmth or drainage present.  No lymphadenopathy present in the axillary area or cervical chain.  DDx included but is not limited to cellulitis, failed outpatient, hidradenitis suppurativa, staph infection or infected abscess.  Will get labs, CT of face, give medications and will reassess.         Imaging: Cellulitis anterior and inferior to the mandible with reactive lymph nodes.    Pt's  complaint is most consistent with cellulitis.  We did not see an indication for further labs or imaging at this time in the emergency department.  Pt will be admitted to inpatient services for failed outpatient treatment and cellulitis.  I have started pt on Vancomycin 1500 mg IV.  Pt's tetanus status is unknown and will be given a shot.  Pt is comfortable with this plan.  Pt is not stable for discharge.    Vinny De Paz NP-C    I discussed the care of this pt and with my supervising MD.                       Clinical Impression:   The encounter diagnosis was Cellulitis diffuse, face.                             Vinny De Paz III, NP  09/30/18 2723

## 2018-09-30 NOTE — ASSESSMENT & PLAN NOTE
27 y/o with chin/submental cellulitis s/p I&D at OSH and needle aspiration without the return of pus    -continue care per primary team   -agree with vancomycin   -monitor clinically, may need CT w/ contrast if no improvement   -okay for diet from ENT perspective   -ENT will follow, call with questions

## 2018-09-30 NOTE — CONSULTS
[facility]  Otorhinolaryngology-Head & Neck Surgery  Consult Note    Patient Name: Eboni Betancourt  MRN: [id number]  Code Status: Full Code  Admission Date: 9/30/2018  Hospital Length of Stay: 0 days  Attending Physician: Lucy Cruz MD  Primary Care Provider: Primary Doctor No    Patient information was obtained from patient, past medical records and ER records.     Inpatient consult to ENT  Consult performed by: Ynes Pleitez MD  Consult ordered by: Leyla Woodruff MD        Subjective:     Chief Complaint/Reason for Admission: facial cellulitis     History of Present Illness: Ms. Betancourt is a 27 y/o female with PMH of PCOS and gastric bypass surgery who presents to the ED with concern for facial and axillary abscesses. ENT was consulted for facial abscess. She reports that she had her axillary abscesses drained at an OSH Tuesday, but she returned to the hospital Thursday with facial swelling and a submental abscess was drained. She reports pus was expressed. She was prescribed Bactrim Tuesday and Clindamycin Thursday and told to take both. She has not noted any improvement in symptoms and by today she had new right chin swelling concerning for abscess. She denies any fevers at home. She denies recent dental procedures. She reports plucking chin/submental hairs and picking at them constantly. She has never had abscesses in the past. She denies trouble breathing, dysphagia or dysphonia.           Medications:  Continuous Infusions:  Scheduled Meds:   enoxaparin  40 mg Subcutaneous Daily    [COMPLETED] lidocaine-EPINEPHrine 1%-1:100,000  10 mL Intradermal Once    tetanus and diphther. tox (PF)  0.5 mL Intramuscular ED 1 Time    [START ON 10/1/2018] vancomycin (VANCOCIN) IVPB  1,500 mg Intravenous Q12H     PRN Meds:acetaminophen, dextrose 50%, dextrose 50%, glucagon (human recombinant), glucose, glucose, HYDROcodone-acetaminophen, ondansetron, promethazine, ramelteon, sodium  chloride 0.9%     No current facility-administered medications on file prior to encounter.      Current Outpatient Medications on File Prior to Encounter   Medication Sig    clindamycin (CLEOCIN) 150 MG capsule Take 2 capsules (300 mg total) by mouth 4 (four) times daily. for 7 days    HYDROcodone-acetaminophen (NORCO) 5-325 mg per tablet Take 1 tablet by mouth every 4 (four) hours as needed for Pain.    sulfamethoxazole-trimethoprim 800-160mg (BACTRIM DS) 800-160 mg Tab Take 1 tablet by mouth 2 (two) times daily. for 7 days       Review of patient's allergies indicates:  No Known Allergies    Past Medical History:   Diagnosis Date    Abscess     Polycystic ovarian disease      Past Surgical History:   Procedure Laterality Date    GASTRIC BYPASS      GASTRIC BYPASS       Family History     None        Tobacco Use    Smoking status: Never Smoker    Smokeless tobacco: Never Used   Substance and Sexual Activity    Alcohol use: No    Drug use: No    Sexual activity: Not on file     Review of Systems   Constitutional: Negative for chills and fever.   HENT: Positive for facial swelling. Negative for congestion, dental problem, ear pain, sore throat, trouble swallowing and voice change.    Eyes: Negative for visual disturbance. Eye discharge: since gastric bypass.   Respiratory: Negative for shortness of breath and stridor.    Cardiovascular: Negative for chest pain.   Gastrointestinal: Negative for abdominal pain, nausea and vomiting.   Endocrine: Positive for cold intolerance.   Genitourinary: Negative for difficulty urinating.   Musculoskeletal: Negative for neck pain.   Skin: Positive for wound.   Allergic/Immunologic: Negative for immunocompromised state.   Neurological: Negative for dizziness and headaches.   Hematological: Does not bruise/bleed easily.   Psychiatric/Behavioral: Negative for decreased concentration and dysphoric mood. The patient is not nervous/anxious.      Objective:     Vital Signs  (Most Recent):  Temp: 98.6 °F (37 °C) (09/30/18 1135)  Pulse: 71 (09/30/18 1135)  Resp: 16 (09/30/18 1135)  BP: (!) 144/90 (09/30/18 1135)  SpO2: 99 % (09/30/18 1135) Vital Signs (24h Range):  Temp:  [98.6 °F (37 °C)] 98.6 °F (37 °C)  Pulse:  [71] 71  Resp:  [16] 16  SpO2:  [99 %] 99 %  BP: (144)/(90) 144/90     Weight: 102.1 kg (225 lb)  Body mass index is 37.44 kg/m².         Physical Exam   Constitutional: Well appearing / communicating.  NAD.  Eyes: EOM I Bilaterally  Head/Face: Normocephalic.  Negative paranasal sinus pressure/tenderness.  Salivary glands WNL.  House Brackmann I Bilaterally. Right chin swelling and induration with possible fluctuance of left chin.   Right Ear: External Auditory Canal WNL.  Auricle WNL.  Left Ear: External Auditory Canal WNL. Auricle WNL.  Nose: No gross nasal septal deviation. Inferior Turbinates 2+ bilaterally. No septal perforation. No masses/lesions. External nasal skin without masses/lesions.  Oral Cavity: Gingiva/lips WNL.  FOM Soft, no masses palpated. Oral Tongue mobile. Hard Palate WNL.   Oropharynx: Tonsillar fossa/pharyngeal wall without lesions. Posterior oropharynx WNL.  Soft palate without masses. Midline uvula.   Neck/Lymphatic: No LAD I-VI bilaterally.  No thyromegaly. Right submental swelling with 1-2cm area of discrete induration with small 4mm cut, no purulence able to be expressed, multiple excoriations/abrasions over submental area with short hairs   Neuro/Psychiatric: AOx3.  Normal mood and affect.   Respiratory: Normal respiratory effort, no stridor, no retractions noted.    Procedure:   After verbal consent was obtained outlining the risks, benefits, and alternatives, needle aspiration procedure was began. 3 cc of 1% lidocaine with 1:100K epinephrine was injected into left lower lip vestibule mucosa. An 18G needle was used to aspirate multiple areas of questionable flutuance without the return of pus. The patient tolerated the procedure well.        Significant Labs:  CBC:   Recent Labs   Lab  09/30/18   1301  09/30/18   1306   WBC  10.07   --    RBC  4.43   --    HGB  13.6   --    HCT  40.3  40   PLT  262   --    MCV  91   --    MCH  30.7   --    MCHC  33.7   --      CMP: No results for input(s): GLU, CALCIUM, ALBUMIN, PROT, NA, K, CO2, CL, BUN, CREATININE, ALKPHOS, ALT, AST, BILITOT in the last 168 hours.    Significant Diagnostics:  CT: I have reviewed all pertinent results/findings within the past 24 hours and my personal findings are:  evaluation limited without contrast, no definitive abscess identified, cellulitis     Assessment/Plan:     * Facial cellulitis    27 y/o with chin/submental cellulitis s/p I&D at OSH and needle aspiration without the return of pus    -continue care per primary team   -agree with vancomycin   -monitor clinically, may need CT w/ contrast if no improvement   -okay for diet from ENT perspective   -ENT will follow, call with questions           VTE Risk Mitigation (From admission, onward)        Ordered     enoxaparin injection 40 mg  Daily      09/30/18 1639     Place KALINA hose  Until discontinued      09/30/18 1639     IP VTE HIGH RISK PATIENT  Once      09/30/18 1639          Thank you for your consult. I will follow-up with patient. Please contact us if you have any additional questions.    Ynes Pleitez MD  Otorhinolaryngology-Head & Neck Surgery  [facility]

## 2018-10-01 PROBLEM — E87.6 HYPOKALEMIA: Status: ACTIVE | Noted: 2018-10-01

## 2018-10-01 LAB
ANION GAP SERPL CALC-SCNC: 13 MMOL/L
BASOPHILS # BLD AUTO: 0.03 K/UL
BASOPHILS NFR BLD: 0.3 %
BUN SERPL-MCNC: 3 MG/DL
CALCIUM SERPL-MCNC: 9 MG/DL
CHLORIDE SERPL-SCNC: 105 MMOL/L
CO2 SERPL-SCNC: 23 MMOL/L
CREAT SERPL-MCNC: 0.7 MG/DL
DIFFERENTIAL METHOD: ABNORMAL
EOSINOPHIL # BLD AUTO: 0.1 K/UL
EOSINOPHIL NFR BLD: 1.4 %
ERYTHROCYTE [DISTWIDTH] IN BLOOD BY AUTOMATED COUNT: 12.3 %
EST. GFR  (AFRICAN AMERICAN): >60 ML/MIN/1.73 M^2
EST. GFR  (NON AFRICAN AMERICAN): >60 ML/MIN/1.73 M^2
GLUCOSE SERPL-MCNC: 93 MG/DL
HCT VFR BLD AUTO: 37.7 %
HGB BLD-MCNC: 12.8 G/DL
IMM GRANULOCYTES # BLD AUTO: 0.03 K/UL
IMM GRANULOCYTES NFR BLD AUTO: 0.3 %
LYMPHOCYTES # BLD AUTO: 2.5 K/UL
LYMPHOCYTES NFR BLD: 28.1 %
MCH RBC QN AUTO: 31.1 PG
MCHC RBC AUTO-ENTMCNC: 34 G/DL
MCV RBC AUTO: 92 FL
MONOCYTES # BLD AUTO: 0.9 K/UL
MONOCYTES NFR BLD: 10 %
NEUTROPHILS # BLD AUTO: 5.3 K/UL
NEUTROPHILS NFR BLD: 59.9 %
NRBC BLD-RTO: 0 /100 WBC
PLATELET # BLD AUTO: 253 K/UL
PMV BLD AUTO: 11.5 FL
POTASSIUM SERPL-SCNC: 3.3 MMOL/L
RBC # BLD AUTO: 4.11 M/UL
SODIUM SERPL-SCNC: 141 MMOL/L
WBC # BLD AUTO: 8.86 K/UL

## 2018-10-01 PROCEDURE — 36415 COLL VENOUS BLD VENIPUNCTURE: CPT

## 2018-10-01 PROCEDURE — 11000001 HC ACUTE MED/SURG PRIVATE ROOM

## 2018-10-01 PROCEDURE — 25000003 PHARM REV CODE 250: Performed by: INTERNAL MEDICINE

## 2018-10-01 PROCEDURE — 99232 SBSQ HOSP IP/OBS MODERATE 35: CPT | Mod: ,,, | Performed by: INTERNAL MEDICINE

## 2018-10-01 PROCEDURE — 63600175 PHARM REV CODE 636 W HCPCS: Performed by: INTERNAL MEDICINE

## 2018-10-01 PROCEDURE — 25000003 PHARM REV CODE 250: Performed by: PHYSICIAN ASSISTANT

## 2018-10-01 PROCEDURE — 85025 COMPLETE CBC W/AUTO DIFF WBC: CPT

## 2018-10-01 PROCEDURE — 80048 BASIC METABOLIC PNL TOTAL CA: CPT

## 2018-10-01 RX ORDER — DIPHENHYDRAMINE HCL 25 MG
25 CAPSULE ORAL EVERY 6 HOURS PRN
Status: DISCONTINUED | OUTPATIENT
Start: 2018-10-01 | End: 2018-10-03 | Stop reason: HOSPADM

## 2018-10-01 RX ADMIN — VANCOMYCIN HYDROCHLORIDE 1500 MG: 10 INJECTION, POWDER, LYOPHILIZED, FOR SOLUTION INTRAVENOUS at 02:10

## 2018-10-01 RX ADMIN — POTASSIUM BICARBONATE 25 MEQ: 25 TABLET, EFFERVESCENT ORAL at 12:10

## 2018-10-01 RX ADMIN — HYDROCODONE BITARTRATE AND ACETAMINOPHEN 1 TABLET: 5; 325 TABLET ORAL at 02:10

## 2018-10-01 RX ADMIN — HYDROCODONE BITARTRATE AND ACETAMINOPHEN 1 TABLET: 5; 325 TABLET ORAL at 12:10

## 2018-10-01 RX ADMIN — THERA TABS 1 TABLET: TAB at 08:10

## 2018-10-01 RX ADMIN — HYDROCODONE BITARTRATE AND ACETAMINOPHEN 1 TABLET: 5; 325 TABLET ORAL at 08:10

## 2018-10-01 RX ADMIN — HYDROCODONE BITARTRATE AND ACETAMINOPHEN 1 TABLET: 5; 325 TABLET ORAL at 07:10

## 2018-10-01 RX ADMIN — DIPHENHYDRAMINE HYDROCHLORIDE 25 MG: 25 CAPSULE ORAL at 03:10

## 2018-10-01 RX ADMIN — DIPHENHYDRAMINE HYDROCHLORIDE 25 MG: 25 CAPSULE ORAL at 02:10

## 2018-10-01 RX ADMIN — VANCOMYCIN HYDROCHLORIDE 1500 MG: 10 INJECTION, POWDER, LYOPHILIZED, FOR SOLUTION INTRAVENOUS at 03:10

## 2018-10-01 NOTE — PROGRESS NOTES
VANCOMYCIN CONSULT     Current Vancomycin Order: 1.5 g Q 12 hr      Vancomycin Indication: Facial cellulitis     Renal Replacement Therapy: N/A - stable renal function     Planned Duration of Therapy: Unknown     Trough Goal: 10-15 (unless indicated otherwise)     Vancomycin recommendations: Continue with 1.5 g Q 12 hr, vancomycin trough scheduled for 10/2 @ 0230. Growing staph aureus, pharmacy will follow up on Tuesday    For questions, please contact the pharmacist: Kika Burleson      Phone: [phone]

## 2018-10-01 NOTE — PROGRESS NOTES
[facility]  Otorhinolaryngology-Head & Neck Surgery  Progress Note    Subjective:     Post-Op Info:  * No surgery found *      Hospital Day: 2     Interval History: NAEON. Reports decreased swelling of chin.    Medications:  Continuous Infusions:  Scheduled Meds:   enoxaparin  40 mg Subcutaneous Daily    vancomycin (VANCOCIN) IVPB  1,500 mg Intravenous Q12H     PRN Meds:acetaminophen, dextrose 50%, dextrose 50%, diphenhydrAMINE, glucagon (human recombinant), glucose, glucose, HYDROcodone-acetaminophen, ondansetron, promethazine, ramelteon, sodium chloride 0.9%     Review of patient's allergies indicates:  No Known Allergies  Objective:     Vital Signs (24h Range):  Temp:  [98.2 °F (36.8 °C)-99.4 °F (37.4 °C)] 98.2 °F (36.8 °C)  Pulse:  [58-71] 67  Resp:  [16-18] 18  SpO2:  [95 %-99 %] 97 %  BP: (111-144)/(66-90) 115/66        Lines/Drains/Airways     Peripheral Intravenous Line                 Peripheral IV - Single Lumen 09/30/18 2200 Posterior;Right Forearm less than 1 day                Physical Exam   AAO, NAD  EOMI, PERRLA  Oral cavity without masses or lesions. Fair dentition  Mental erythema, induration, edema. No palpable area of fluctuance      Significant Labs:  BMP: No results for input(s): GLU, CL, CO2, BUN, CREATININE, CALCIUM, MG in the last 168 hours.    Invalid input(s): SODIUM, POTASSIUM  CBC:   Recent Labs   Lab  09/30/18   1301  09/30/18   1306   WBC  10.07   --    RBC  4.43   --    HGB  13.6   --    HCT  40.3  40   PLT  262   --    MCV  91   --    MCH  30.7   --    MCHC  33.7   --        Significant Diagnostics:  None    Assessment/Plan:     * Facial cellulitis    27 y/o with chin/submental cellulitis s/p I&D at OSH and needle aspiration without the return of pus    -continue care per primary team   -continue vancomycin   -monitor clinically, may need CT w/ contrast if no improvement   -okay for diet from ENT perspective   -ENT will follow, call with questions              Lauren Artis MD  Otorhinolaryngology-Head & Neck Surgery  [facility]

## 2018-10-01 NOTE — PLAN OF CARE
NO PCP AT THIS TIME  PH[facility]ACY [facility]      10/01/18 0957   Discharge Assessment   Assessment Type Discharge Planning Assessment   Confirmed/corrected address and phone number on facesheet? Yes   Assessment information obtained from? Patient   Expected Length of Stay (days) 3   Communicated expected length of stay with patient/caregiver no   Prior to hospitilization cognitive status: Alert/Oriented   Prior to hospitalization functional status: Independent   Current cognitive status: Alert/Oriented   Current Functional Status: Independent   Lives With friend(s)   Able to Return to Prior Arrangements yes   Is patient able to care for self after discharge? Yes   Patient's perception of discharge disposition home or selfcare   Readmission Within The Last 30 Days no previous admission in last 30 days   Patient currently being followed by outpatient case management? No   Patient currently receives any other outside agency services? No   Equipment Currently Used at Home none   Do you have any problems affording any of your prescribed medications? No   Is the patient taking medications as prescribed? yes   Does the patient have transportation home? Yes   Transportation Available car;family or friend will provide   Does the patient receive services at the [facility]? No   Discharge Plan A Home   Discharge Plan B Home   Patient/Family In Agreement With Plan yes   JADA YANG

## 2018-10-01 NOTE — SUBJECTIVE & OBJECTIVE
Interval History: NAEON. Reports decreased swelling of chin.    Medications:  Continuous Infusions:  Scheduled Meds:   enoxaparin  40 mg Subcutaneous Daily    vancomycin (VANCOCIN) IVPB  1,500 mg Intravenous Q12H     PRN Meds:acetaminophen, dextrose 50%, dextrose 50%, diphenhydrAMINE, glucagon (human recombinant), glucose, glucose, HYDROcodone-acetaminophen, ondansetron, promethazine, ramelteon, sodium chloride 0.9%     Review of patient's allergies indicates:  No Known Allergies  Objective:     Vital Signs (24h Range):  Temp:  [98.2 °F (36.8 °C)-99.4 °F (37.4 °C)] 98.2 °F (36.8 °C)  Pulse:  [58-71] 67  Resp:  [16-18] 18  SpO2:  [95 %-99 %] 97 %  BP: (111-144)/(66-90) 115/66        Lines/Drains/Airways     Peripheral Intravenous Line                 Peripheral IV - Single Lumen 09/30/18 2200 Posterior;Right Forearm less than 1 day                Physical Exam   AAO, NAD  EOMI, PERRLA  Oral cavity without masses or lesions. Fair dentition  Mental erythema, induration, edema. No palpable area of fluctuance      Significant Labs:  BMP: No results for input(s): GLU, CL, CO2, BUN, CREATININE, CALCIUM, MG in the last 168 hours.    Invalid input(s): SODIUM, POTASSIUM  CBC:   Recent Labs   Lab  09/30/18   1301  09/30/18   1306   WBC  10.07   --    RBC  4.43   --    HGB  13.6   --    HCT  40.3  40   PLT  262   --    MCV  91   --    MCH  30.7   --    MCHC  33.7   --        Significant Diagnostics:  None

## 2018-10-01 NOTE — PROGRESS NOTES
Hospital Medicine   Progress note   Team: [facility] TOM Woodruff MD   Admit Date: 9/30/2018    Code status: Full Code   Principal Problem: Facial cellulitis     Interval hx: No events overnight.  Swelling in chin has slightly improved.  Tmax 99.4F.  Blood cx NGTD.  ENT evaluated yesterday and no purulence noted.  Will need to continue IV abx for now.       ROS   Constitutional: Negative for chills, fatigue, fever.   HENT: Negative for sore throat, trouble swallowing.    Eyes: Negative for photophobia, visual disturbance.   Respiratory: Negative for cough, shortness of breath.    Cardiovascular: Negative for chest pain, palpitations, leg swelling.   Gastrointestinal: Negative for abdominal pain, constipation, diarrhea, nausea, vomiting.   Endocrine: Negative for cold intolerance, heat intolerance.   Genitourinary: Negative for dysuria, frequency.   Musculoskeletal: Negative for arthralgias, myalgias.   Skin: Negative for  wound, +rash,erythema   Neurological: Negative for dizziness, syncope, weakness, light-headedness.   Psychiatric/Behavioral: Negative for confusion, hallucinations, anxiety  All other systems reviewed and are negative.    Constitutional: Negative for chills, fatigue, fever.   HENT: Negative for sore throat, trouble swallowing.    Eyes: Negative for photophobia, visual disturbance.   Respiratory: Negative for cough, shortness of breath.    Cardiovascular: Negative for chest pain, palpitations, leg swelling.   Gastrointestinal: Negative for abdominal pain, constipation, diarrhea, nausea, vomiting.   Endocrine: Negative for cold intolerance, heat intolerance.   Genitourinary: Negative for dysuria, frequency.   Musculoskeletal: Negative for arthralgias, myalgias.   Skin: Negative for  wound, +rash,erythema   Neurological: Negative for dizziness, syncope, weakness, light-headedness.   Psychiatric/Behavioral: Negative for confusion, hallucinations, anxiety  All other systems reviewed and are  negative.      PEx   Temp:  [98.2 °F (36.8 °C)-99.4 °F (37.4 °C)]   Pulse:  [58-71]   Resp:  [16-18]   BP: (111-144)/(66-90)   SpO2:  [95 %-99 %]      I & O (Last 24H):   No intake or output data in the 24 hours ending 10/01/18 0724    General appearance: no distress, alert and oriented x 3  HEENT:  conjunctivae/corneas clear, PERRL, mucous membranes moist   Neck: supple, thyroid not enlarged  Pulm:   normal respiratory effort, CTAB, no wheezes, rales or rhonchi  Card: RRR, S1, S2 normal, no murmur, click, rub or gallop  Abd: soft, NT, ND, BS present; no masses, no organomegaly  Ext: no c/c/e   Pulses: 2+, symmetric  Skin:  2 large areas of erythema and edema noted under the chin, tender to palpation, the one more superiorly does have a central punctum, mild fluctuance noted, multiple erythematous areas noted under the axilla, few open lesions from previous incision and drainage, no fluctuance noted, mild purulent discharge  Neuro: CN II-XII grossly intact, no focal numbness or weakness, normal strength and tone   Psych: normal mood and affect      Recent Results (from the past 24 hour(s))   POCT urine pregnancy    Collection Time: 09/30/18  1:00 PM   Result Value Ref Range    POC Preg Test, Ur Negative Negative     Acceptable Yes    CBC auto differential    Collection Time: 09/30/18  1:01 PM   Result Value Ref Range    WBC 10.07 3.90 - 12.70 K/uL    RBC 4.43 4.00 - 5.40 M/uL    Hemoglobin 13.6 12.0 - 16.0 g/dL    Hematocrit 40.3 37.0 - 48.5 %    MCV 91 82 - 98 fL    MCH 30.7 27.0 - 31.0 pg    MCHC 33.7 32.0 - 36.0 g/dL    RDW 12.4 11.5 - 14.5 %    Platelets 262 150 - 350 K/uL    MPV 10.6 9.2 - 12.9 fL    Immature Granulocytes 0.4 0.0 - 0.5 %    Gran # (ANC) 6.4 1.8 - 7.7 K/uL    Immature Grans (Abs) 0.04 0.00 - 0.04 K/uL    Lymph # 2.5 1.0 - 4.8 K/uL    Mono # 1.0 0.3 - 1.0 K/uL    Eos # 0.1 0.0 - 0.5 K/uL    Baso # 0.03 0.00 - 0.20 K/uL    nRBC 0 0 /100 WBC    Gran% 63.9 38.0 - 73.0 %    Lymph% 25.1  18.0 - 48.0 %    Mono% 9.6 4.0 - 15.0 %    Eosinophil% 0.7 0.0 - 8.0 %    Basophil% 0.3 0.0 - 1.9 %    Differential Method Automated    ISTAT PROCEDURE    Collection Time: 09/30/18  1:06 PM   Result Value Ref Range    POC Glucose 88 70 - 110 mg/dL    POC BUN 4 (L) 6 - 30 mg/dL    POC Creatinine 0.7 0.5 - 1.4 mg/dL    POC Sodium 140 136 - 145 mmol/L    POC Potassium 3.6 3.5 - 5.1 mmol/L    POC Chloride 101 95 - 110 mmol/L    POC TCO2 (MEASURED) 27 23 - 29 mmol/L    POC Ionized Calcium 1.16 1.06 - 1.42 mmol/L    POC Hematocrit 40 36 - 54 %PCV    Sample JAS    Blood culture    Collection Time: 09/30/18  3:54 PM   Result Value Ref Range    Blood Culture, Routine No Growth to date        No results for input(s): POCTGLUCOSE in the last 168 hours.    No results found for: HGBA1C     Active Hospital Problems    Diagnosis  POA    *Facial cellulitis [L03.211]  Unknown    PCOS (polycystic ovarian syndrome) [E28.2]  Unknown    Hx of gastric bypass [Z98.84]  Not Applicable    Cellulitis diffuse, face [L03.211]  Yes      Resolved Hospital Problems   No resolved problems to display.         enoxaparin  40 mg Subcutaneous Daily    vancomycin (VANCOCIN) IVPB  1,500 mg Intravenous Q12H     acetaminophen, dextrose 50%, dextrose 50%, diphenhydrAMINE, glucagon (human recombinant), glucose, glucose, HYDROcodone-acetaminophen, ondansetron, promethazine, ramelteon, sodium chloride 0.9%    Overview: 27 yo female with PMH PCOS and gastric bypass who presents with facial cellulitis and axillary abscesses.      Assessment and Plan for problems addressed today:   1. Facial cellulitis  - given dose of vanco in ED, will continue q12  - I&D performed previously, ENT tried to do needle aspiration and no purulence was noted  - CT maxillofacial shows no abscess but not done with contrast   - warm compresses  - blood cx x 2- NGTD  - aerobic cx pending  - monitor CBC  - consulted ENT, appreciate recs     2. Axillary abscesses  - s/p I&D  -  concern for hidradenitis  - warm compresses  - wound care consult      3. PCOS  - currently not on medication      4. Hx gastric bypass  - continue MVI    5. Hypokalemia  - replace   - Bmp in am     Diet: regular  DVT PPx: lovenox  Code status: FULL    Discharge plan and follow up: continue IV abx for now, appreciate ENT assistance, still awaiting blood cx results, consulted wound care for axillary abscesses      Leyla Woodruff MD  Hospital Medicine Staff  [phone] pager

## 2018-10-01 NOTE — PLAN OF CARE
Problem: Patient Care Overview  Goal: Plan of Care Review  Outcome: Ongoing (interventions implemented as appropriate)  Pt remains free of falls and injury. Pt provided with PRN analgesic with positive results. Ambulates with good gait. Bed low and locked, Call light within reach.

## 2018-10-02 VITALS
HEART RATE: 70 BPM | RESPIRATION RATE: 18 BRPM | WEIGHT: 246.69 LBS | SYSTOLIC BLOOD PRESSURE: 110 MMHG | DIASTOLIC BLOOD PRESSURE: 70 MMHG | BODY MASS INDEX: 41.1 KG/M2 | OXYGEN SATURATION: 98 % | TEMPERATURE: 99 F | HEIGHT: 65 IN

## 2018-10-02 PROBLEM — L73.2 HYDRADENITIS: Status: ACTIVE | Noted: 2018-10-02

## 2018-10-02 LAB
ANION GAP SERPL CALC-SCNC: 9 MMOL/L
BACTERIA SPEC AEROBE CULT: NORMAL
BASOPHILS # BLD AUTO: 0.04 K/UL
BASOPHILS NFR BLD: 0.5 %
BUN SERPL-MCNC: 3 MG/DL
CALCIUM SERPL-MCNC: 8.9 MG/DL
CHLORIDE SERPL-SCNC: 104 MMOL/L
CO2 SERPL-SCNC: 27 MMOL/L
CREAT SERPL-MCNC: 0.7 MG/DL
DIFFERENTIAL METHOD: ABNORMAL
EOSINOPHIL # BLD AUTO: 0.1 K/UL
EOSINOPHIL NFR BLD: 1.6 %
ERYTHROCYTE [DISTWIDTH] IN BLOOD BY AUTOMATED COUNT: 12.3 %
EST. GFR  (AFRICAN AMERICAN): >60 ML/MIN/1.73 M^2
EST. GFR  (NON AFRICAN AMERICAN): >60 ML/MIN/1.73 M^2
GLUCOSE SERPL-MCNC: 79 MG/DL
HCT VFR BLD AUTO: 38 %
HGB BLD-MCNC: 13 G/DL
IMM GRANULOCYTES # BLD AUTO: 0.04 K/UL
IMM GRANULOCYTES NFR BLD AUTO: 0.5 %
LYMPHOCYTES # BLD AUTO: 2.6 K/UL
LYMPHOCYTES NFR BLD: 29.8 %
MCH RBC QN AUTO: 31.3 PG
MCHC RBC AUTO-ENTMCNC: 34.2 G/DL
MCV RBC AUTO: 91 FL
MONOCYTES # BLD AUTO: 0.9 K/UL
MONOCYTES NFR BLD: 10 %
NEUTROPHILS # BLD AUTO: 5.1 K/UL
NEUTROPHILS NFR BLD: 57.6 %
NRBC BLD-RTO: 0 /100 WBC
PLATELET # BLD AUTO: 272 K/UL
PMV BLD AUTO: 11.3 FL
POTASSIUM SERPL-SCNC: 3.5 MMOL/L
RBC # BLD AUTO: 4.16 M/UL
SODIUM SERPL-SCNC: 140 MMOL/L
VANCOMYCIN TROUGH SERPL-MCNC: 8 UG/ML
WBC # BLD AUTO: 8.77 K/UL

## 2018-10-02 PROCEDURE — 63600175 PHARM REV CODE 636 W HCPCS: Performed by: INTERNAL MEDICINE

## 2018-10-02 PROCEDURE — 80202 ASSAY OF VANCOMYCIN: CPT

## 2018-10-02 PROCEDURE — 25000003 PHARM REV CODE 250: Performed by: PHYSICIAN ASSISTANT

## 2018-10-02 PROCEDURE — 85025 COMPLETE CBC W/AUTO DIFF WBC: CPT

## 2018-10-02 PROCEDURE — 36415 COLL VENOUS BLD VENIPUNCTURE: CPT

## 2018-10-02 PROCEDURE — 99239 HOSP IP/OBS DSCHRG MGMT >30: CPT | Mod: ,,, | Performed by: HOSPITALIST

## 2018-10-02 PROCEDURE — 25000003 PHARM REV CODE 250: Performed by: INTERNAL MEDICINE

## 2018-10-02 PROCEDURE — 63600175 PHARM REV CODE 636 W HCPCS: Performed by: HOSPITALIST

## 2018-10-02 PROCEDURE — 80048 BASIC METABOLIC PNL TOTAL CA: CPT

## 2018-10-02 RX ORDER — HYDROCODONE BITARTRATE AND ACETAMINOPHEN 5; 325 MG/1; MG/1
1 TABLET ORAL EVERY 8 HOURS PRN
Qty: 20 TABLET | Refills: 0 | Status: SHIPPED | OUTPATIENT
Start: 2018-10-02

## 2018-10-02 RX ORDER — VANCOMYCIN 1.75 GRAM/500 ML IN 0.9 % SODIUM CHLORIDE INTRAVENOUS
1750
Status: DISCONTINUED | OUTPATIENT
Start: 2018-10-02 | End: 2018-10-03 | Stop reason: HOSPADM

## 2018-10-02 RX ORDER — SULFAMETHOXAZOLE AND TRIMETHOPRIM 800; 160 MG/1; MG/1
2 TABLET ORAL 2 TIMES DAILY
Qty: 40 TABLET | Refills: 0 | Status: SHIPPED | OUTPATIENT
Start: 2018-10-02 | End: 2018-10-12

## 2018-10-02 RX ORDER — BUTYROSPERMUM PARKII(SHEA BUTTER), SIMMONDSIA CHINENSIS (JOJOBA) SEED OIL, ALOE BARBADENSIS LEAF EXTRACT .01; 1; 3.5 G/100G; G/100G; G/100G
250 LIQUID TOPICAL 2 TIMES DAILY
COMMUNITY
Start: 2018-10-02

## 2018-10-02 RX ADMIN — DIPHENHYDRAMINE HYDROCHLORIDE 25 MG: 25 CAPSULE ORAL at 03:10

## 2018-10-02 RX ADMIN — HYDROCODONE BITARTRATE AND ACETAMINOPHEN 1 TABLET: 5; 325 TABLET ORAL at 06:10

## 2018-10-02 RX ADMIN — DIPHENHYDRAMINE HYDROCHLORIDE 25 MG: 25 CAPSULE ORAL at 02:10

## 2018-10-02 RX ADMIN — THERA TABS 1 TABLET: TAB at 08:10

## 2018-10-02 RX ADMIN — HYDROCODONE BITARTRATE AND ACETAMINOPHEN 1 TABLET: 5; 325 TABLET ORAL at 03:10

## 2018-10-02 RX ADMIN — Medication 1750 MG: at 02:10

## 2018-10-02 RX ADMIN — HYDROCODONE BITARTRATE AND ACETAMINOPHEN 1 TABLET: 5; 325 TABLET ORAL at 10:10

## 2018-10-02 RX ADMIN — VANCOMYCIN HYDROCHLORIDE 1500 MG: 10 INJECTION, POWDER, LYOPHILIZED, FOR SOLUTION INTRAVENOUS at 03:10

## 2018-10-02 NOTE — PROGRESS NOTES
Hospital Medicine   Progress note   Team: [facility] HOSP MED A Tristan Kelly MD   Admit Date: 9/30/2018 10/3/2018   Code status: Full Code   Principal Problem: Facial cellulitis       Interval hx: No events overnight.  Swelling in chin has slightly improved.  ENT evaluated they sign off.  IV vanc change to Bactrim given culture. D/C home      ROS   Constitutional: Negative for chills, fatigue, fever.   HENT: Negative for sore throat, trouble swallowing.    Eyes: Negative for photophobia, visual disturbance.   Respiratory: Negative for cough, shortness of breath.    Cardiovascular: Negative for chest pain, palpitations, leg swelling.   Gastrointestinal: Negative for abdominal pain, constipation, diarrhea, nausea, vomiting.   Endocrine: Negative for cold intolerance, heat intolerance.   Genitourinary: Negative for dysuria, frequency.   Musculoskeletal: Negative for arthralgias, myalgias.   Skin: Negative for  wound, rash,erythema   Neurological: Negative for dizziness, syncope, weakness, light-headedness.   Psychiatric/Behavioral: Negative for confusion, hallucinations, anxiety  All other systems reviewed and are negative.     PEx   Temp:  [96.8 °F (36 °C)-98.7 °F (37.1 °C)]   Pulse:  [67-82]   Resp:  [18]   BP: ()/(55-84)   SpO2:  [96 %-98 %]      I & O (Last 24H):     Intake/Output Summary (Last 24 hours) at 10/2/2018 1506  Last data filed at 10/1/2018 1800  Gross per 24 hour   Intake 250 ml   Output --   Net 250 ml       General appearance: no distress, alert and oriented x 3  HEENT:  conjunctivae/corneas clear, PERRL, mucous membranes moist   Neck: supple, thyroid not enlarged  Pulm:   normal respiratory effort, CTAB, no wheezes, rales or rhonchi  Card: RRR, S1, S2 normal, no murmur, click, rub or gallop  Abd: soft, NT, ND, BS present; no masses, no organomegaly  Ext: no c/c/e   Pulses: 2+, symmetric  Skin:  2 large areas of erythema and edema noted under the chin, tender to palpation, the one more superiorly  does have a central punctum, mild fluctuance noted, multiple erythematous areas noted under the axilla, few open lesions from previous incision and drainage, no fluctuance noted, mild purulent discharge  Neuro: CN II-XII grossly intact, no focal numbness or weakness, normal strength and tone   Psych: normal mood and affect      Recent Results (from the past 24 hour(s))   Basic Metabolic Panel (BMP)    Collection Time: 10/02/18  3:41 AM   Result Value Ref Range    Sodium 140 136 - 145 mmol/L    Potassium 3.5 3.5 - 5.1 mmol/L    Chloride 104 95 - 110 mmol/L    CO2 27 23 - 29 mmol/L    Glucose 79 70 - 110 mg/dL    BUN, Bld 3 (L) 6 - 20 mg/dL    Creatinine 0.7 0.5 - 1.4 mg/dL    Calcium 8.9 8.7 - 10.5 mg/dL    Anion Gap 9 8 - 16 mmol/L    eGFR if African American >60.0 >60 mL/min/1.73 m^2    eGFR if non African American >60.0 >60 mL/min/1.73 m^2   CBC with Automated Differential    Collection Time: 10/02/18  3:41 AM   Result Value Ref Range    WBC 8.77 3.90 - 12.70 K/uL    RBC 4.16 4.00 - 5.40 M/uL    Hemoglobin 13.0 12.0 - 16.0 g/dL    Hematocrit 38.0 37.0 - 48.5 %    MCV 91 82 - 98 fL    MCH 31.3 (H) 27.0 - 31.0 pg    MCHC 34.2 32.0 - 36.0 g/dL    RDW 12.3 11.5 - 14.5 %    Platelets 272 150 - 350 K/uL    MPV 11.3 9.2 - 12.9 fL    Immature Granulocytes 0.5 0.0 - 0.5 %    Gran # (ANC) 5.1 1.8 - 7.7 K/uL    Immature Grans (Abs) 0.04 0.00 - 0.04 K/uL    Lymph # 2.6 1.0 - 4.8 K/uL    Mono # 0.9 0.3 - 1.0 K/uL    Eos # 0.1 0.0 - 0.5 K/uL    Baso # 0.04 0.00 - 0.20 K/uL    nRBC 0 0 /100 WBC    Gran% 57.6 38.0 - 73.0 %    Lymph% 29.8 18.0 - 48.0 %    Mono% 10.0 4.0 - 15.0 %    Eosinophil% 1.6 0.0 - 8.0 %    Basophil% 0.5 0.0 - 1.9 %    Differential Method Automated    VANCOMYCIN, TROUGH before 4th dose    Collection Time: 10/02/18  3:41 AM   Result Value Ref Range    Vancomycin-Trough 8.0 (L) 10.0 - 22.0 ug/mL       No results for input(s): POCTGLUCOSE in the last 168 hours.    No results found for: HGBA1C     Active Hospital  Problems    Diagnosis  POA    *Facial cellulitis [L03.211]  Yes    Hydradenitis [L73.2]  Yes    Hypokalemia [E87.6]  No    PCOS (polycystic ovarian syndrome) [E28.2]  Yes    Hx of gastric bypass [Z98.84]  Not Applicable    Cellulitis diffuse, face [L03.211]  Yes      Resolved Hospital Problems   No resolved problems to display.         enoxaparin  40 mg Subcutaneous Daily    multivitamin  1 tablet Oral Daily    vancomycin (VANCOCIN) IVPB  1,750 mg Intravenous Q12H     acetaminophen, dextrose 50%, dextrose 50%, diphenhydrAMINE, glucagon (human recombinant), glucose, glucose, HYDROcodone-acetaminophen, ondansetron, promethazine, ramelteon, sodium chloride 0.9%    Overview: 27 yo female with PMH PCOS and gastric bypass who presents with facial cellulitis and axillary abscesses.      Assessment and Plan for problems addressed today:     1. Facial cellulitis  - given dose of vanco in ED, will continue q12  - I&D performed previously, ENT tried to do needle aspiration and no purulence was noted  - CT maxillofacial shows no abscess but not done with contrast   - warm compresses  - blood cx x 2- NGTD  - aerobic cx: MRSA  - consulted ENT, appreciate recs  - change to PO Bactrim for 10 days treatment course  - PRN pain med       2. Axillary abscesses  - s/p I&D  - may have hydradenitis  - warm compresses  - wound care consult, routine wound care       3. PCOS  - currently not on medication        4. Hx gastric bypass  - continue MVI      5. Hypokalemia  - replace   - Bmp in am       Diet: Regular  DVT PPx: Lovenox  Code status: FULL    Discharge plan and follow up:  appreciate ENT assistance, PO bactrim for home  D/C home     Tristan Kelly MD  Hospital Medicine Staff

## 2018-10-02 NOTE — PROGRESS NOTES
VANCOMYCIN LEVEL EVALUATION     Current Vancomycin Order: 1.5 g Q 12 hr      Vancomycin Indication: Facial cellulitis      Renal Replacement Therapy: N/A - stable renal function      Vancomycin Trough: 8.0 (drawn on 10/2 @ 03:40)      Vancomycin recommendations: Culture growing MRSA, sensitivities resulted. If plan is to continue with vancomycin change to 1.25 g Q 8 hr. If plan is to de-escalate will recommend Bactrim DS 2 tabs BID (given patient BMI-41).      For questions, please contact the pharmacist: Kika Burleson      Phone: [phone]

## 2018-10-02 NOTE — PLAN OF CARE
Problem: Patient Care Overview  Goal: Plan of Care Review  Outcome: Ongoing (interventions implemented as appropriate)  Pt remains free of falls and injury. Pt provided with PRN analgesic with positive results. Pt swelling less and states although she feels pressure it is less. Bed low and locked, Call light within reach.

## 2018-10-02 NOTE — SUBJECTIVE & OBJECTIVE
Interval History: NAEON    Medications:  Continuous Infusions:  Scheduled Meds:   enoxaparin  40 mg Subcutaneous Daily    multivitamin  1 tablet Oral Daily    vancomycin (VANCOCIN) IVPB  1,500 mg Intravenous Q12H     PRN Meds:acetaminophen, dextrose 50%, dextrose 50%, diphenhydrAMINE, glucagon (human recombinant), glucose, glucose, HYDROcodone-acetaminophen, ondansetron, promethazine, ramelteon, sodium chloride 0.9%     Review of patient's allergies indicates:  No Known Allergies  Objective:     Vital Signs (24h Range):  Temp:  [96.8 °F (36 °C)-98.7 °F (37.1 °C)] 98 °F (36.7 °C)  Pulse:  [67-95] 69  Resp:  [16-18] 18  SpO2:  [95 %-98 %] 98 %  BP: ()/(55-80) 117/68        Lines/Drains/Airways     Peripheral Intravenous Line                 Peripheral IV - Single Lumen 09/30/18 2200 Posterior;Right Forearm 1 day                Physical Exam     AAO, NAD  EOMI, PERRLA  Oral cavity without masses or lesions. Fair dentition  Improved mental erythema and edema. No palpable area of fluctuance      Significant Labs:  BMP:   Recent Labs   Lab  10/02/18   0341   GLU  79   CL  104   CO2  27   BUN  3*   CREATININE  0.7   CALCIUM  8.9     CBC:   Recent Labs   Lab  10/02/18   0341   WBC  8.77   RBC  4.16   HGB  13.0   HCT  38.0   PLT  272   MCV  91   MCH  31.3*   MCHC  34.2       Significant Diagnostics:  None

## 2018-10-02 NOTE — CONSULTS
Wound care consult received for chin and left axilla area. Patient has small pustule to each area. Discussed with primary team; appears patient may have hydradenitis. Recommend patient follow with Dermatology for long term treatment of the hydradenitis.   The areas affected will be hard to keep dressed. Recommend cleaning areas daily with normal saline and cover with foam dressings every other day.     Wound care will sign off.  Maria Esther Buckley RN [facility]   [phone]

## 2018-10-02 NOTE — ASSESSMENT & PLAN NOTE
29 y/o with chin/submental cellulitis s/p I&D at OSH and needle aspiration without the return of pus, much improved today.    -continue care per primary team   -ENT will sign off, call with questions

## 2018-10-02 NOTE — PLAN OF CARE
Problem: Patient Care Overview  Goal: Plan of Care Review  Outcome: Ongoing (interventions implemented as appropriate)  Pt is alert and oriented x 4 and has swelling/cellulitis to the face which is swollen at chin area. Her chin began having white discharge and doctor informed that she should not squeeze and just put warm compresses on her face.  Pt has been receiving Vancomycin, third dosage with day shift and and today is the 4th dosage.  Nothing significant to report during this shift.

## 2018-10-02 NOTE — PROGRESS NOTES
[facility]  Otorhinolaryngology-Head & Neck Surgery  Progress Note    Subjective:     Post-Op Info:  * No surgery found *      Hospital Day: 3     Interval History: NAEON    Medications:  Continuous Infusions:  Scheduled Meds:   enoxaparin  40 mg Subcutaneous Daily    multivitamin  1 tablet Oral Daily    vancomycin (VANCOCIN) IVPB  1,500 mg Intravenous Q12H     PRN Meds:acetaminophen, dextrose 50%, dextrose 50%, diphenhydrAMINE, glucagon (human recombinant), glucose, glucose, HYDROcodone-acetaminophen, ondansetron, promethazine, ramelteon, sodium chloride 0.9%     Review of patient's allergies indicates:  No Known Allergies  Objective:     Vital Signs (24h Range):  Temp:  [96.8 °F (36 °C)-98.7 °F (37.1 °C)] 98 °F (36.7 °C)  Pulse:  [67-95] 69  Resp:  [16-18] 18  SpO2:  [95 %-98 %] 98 %  BP: ()/(55-80) 117/68        Lines/Drains/Airways     Peripheral Intravenous Line                 Peripheral IV - Single Lumen 09/30/18 2200 Posterior;Right Forearm 1 day                Physical Exam     AAO, NAD  EOMI, PERRLA  Oral cavity without masses or lesions. Fair dentition  Improved mental erythema and edema. No palpable area of fluctuance      Significant Labs:  BMP:   Recent Labs   Lab  10/02/18   0341   GLU  79   CL  104   CO2  27   BUN  3*   CREATININE  0.7   CALCIUM  8.9     CBC:   Recent Labs   Lab  10/02/18   0341   WBC  8.77   RBC  4.16   HGB  13.0   HCT  38.0   PLT  272   MCV  91   MCH  31.3*   MCHC  34.2       Significant Diagnostics:  None    Assessment/Plan:     * Facial cellulitis    29 y/o with chin/submental cellulitis s/p I&D at OSH and needle aspiration without the return of pus, much improved today.    -continue care per primary team   -ENT will sign off, call with questions            Jacob P Brunner, MD  Otorhinolaryngology-Head & Neck Surgery  [facility]

## 2018-10-03 NOTE — PLAN OF CARE
Problem: Patient Care Overview  Goal: Plan of Care Review  Outcome: Outcome(s) achieved Date Met: 10/02/18  Spoke to primary team Dr. Kelly and pt is ready for discharge.  Pt vitals are stable and she finished her last dosage of Vancomycin today.  Pt received all discharge medications at bedside and it was delivered.  Pt received discharge instructions and IV discontinued.  No significant events during this shift.

## 2018-10-04 ENCOUNTER — PATIENT OUTREACH (OUTPATIENT)
Dept: ADMINISTRATIVE | Facility: CLINIC | Age: 28
End: 2018-10-04

## 2018-10-04 ENCOUNTER — NURSE TRIAGE (OUTPATIENT)
Dept: ADMINISTRATIVE | Facility: CLINIC | Age: 28
End: 2018-10-04

## 2018-10-04 NOTE — TELEPHONE ENCOUNTER
"Pt reporting taking 3 tabs of Bactrim twice a day; Order is 2tabs twice a day. Paged Dr АННА Kelly(hospitalist) and pt to continue taking as prescribed.    Reason for Disposition   [1] DOUBLE DOSE (an extra dose or lesser amount) of prescription drug AND [2] NO symptoms (Exception: a double dose of antibiotics)    Answer Assessment - Initial Assessment Questions  1. SYMPTOMS: "Do you have any symptoms?"      NO  2. SEVERITY: If symptoms are present, ask "Are they mild, moderate or severe?"      NA    Protocols used: ST MEDICATION QUESTION CALL-A-AH      "

## 2018-10-04 NOTE — PATIENT INSTRUCTIONS
Facial Cellulitis  Cellulitis is an infection of the deep layers of skin. A break in the skin, such as a cut or scratch, can let bacteria under the skin. It may also occur from an infected oil gland (pimple) or hair follicle. If the bacteria get to deep layers of the skin, it can be serious. If not treated, cellulitis can get into the bloodstream and lymph nodes. The infection can then spread throughout the body. This causes serious illness.  Cellulitis causes the affected skin to become red, swollen, warm, and sore. The reddened areas have a visible border. You may have a fever, chills, and pain.  Cellulitis is treated with antibiotics taken for 7 to 10 days. Symptoms should get better 1 to 2 days after treatment is started. Make sure to take all the antibiotics for the full number of days until they are gone. Keep taking the medication even if your symptoms go away.  Home care  Follow these tips:  · Take all of the antibiotic medicine exactly as directed until it is gone. Dont miss any doses, especially during the first 7 days. Dont stop taking it when your symptoms get better.  · Use a cool compress (face cloth soaked in cool water) on your face to help reduce swelling and pain.  · You may use acetaminophen or ibuprofen to reduce pain. Dont use these if you have chronic liver or kidney disease, or ever had a stomach ulcer or gastrointestinal bleeding. Talk with your healthcare provider first.  Follow-up care  Follow up with your healthcare provider, or as advised. If your infection does not go away on the first antibiotic, your healthcare provider will prescribe a different one.  When to seek medical advice  Call your healthcare provider right away if any of these occur:  · Fever higher of 100.4º F (38.0º C) or higher after 2 days on antibiotics  · Red areas that spread  · Swelling or pain that gets worse  · Fluid leaking from the skin (pus)  · An eyelid that swells shut or leaks fluid (pus)  · Headache or  neck pain that gets worse  · Unusual drowsiness or confusion  · Convulsions (seizure)  · Change in eyesight     Date Last Reviewed: 9/1/2016  © 7419-9412 [employer]. [address] [place] [address], [place] [address]. All rights reserved. This information is not intended as a substitute for professional medical care. Always follow your healthcare professional's instructions.

## 2018-10-04 NOTE — DISCHARGE SUMMARY
[facility] Hospital Medicine  Discharge Summary      Patient Name: Eboni Betancourt  MRN: [id number]  Admission Date: 9/30/2018  Hospital Length of Stay: 2 days  Discharge Date and Time: 10/2/2018  6:57 PM  Attending Physician: Nadege att. providers found   Discharging Provider: Tristan Kelly MD  Primary Care Provider: Primary Doctor Nadege  [facility] Medicine Team: [facility] MED A Tristan Kelly MD    HPI:   Eboni Betancourt is a 28 y.o. female with past medical history of PCOS and gastric bypass surgery who presents with facial and axillary abscesses for the last 1 week.  Patient notes that she has been to the ER twice for the same.  She has had I&D's both times.  She notes that initially started out as axillary abscesses which she had successful I&D and was prescribed oral Bactrim.  She notes that she went back for a wound check in the ER and had a new chin abscess develop at that time.  She underwent I&D of this and she was given a course of clindamycin.  Patient notes the abscesses worsened and she developed a new one under her chin.  The one under her chin is now so painful and it makes her mouth swollen and hard to swallow.  Patient denies any recent fevers or chills.  She does notes plucking a chin hair recently.  She denies any other sores or open lesions.  She denies any history of abscesses in the past.  She denies any nausea, vomiting or diarrhea.  Patient does note that her cousin had some sort of syndrome with multiple abscesses under her breast.  She denies any of other family history of abscesses.     In the ED, she was given an dose of IV vancomycin.  She underwent CT maxillofacial without contrast which did not show any definitive abscess.  WBC count was normal.  Admission was requested for facial cellulitis.      * No surgery found *      Hospital Course: Admitted to hospital medicine on 9/30/2018 with facial cellulitis and failed outpatient abx therapy. She was given dose of vanco in ED,  will continue q12hr. I&D performed previously, ENT tried to do needle aspiration and no purulence was noted. CT maxillofacial shows no abscess but not done with contrast. Blood cx x 2- NGTD, aerobic cx: MRSA. Appreciate ENT recs. Last dose on Vancomycin on discharge this afternoon. Start PO Bactrim for home tomorrow, 10/3  for 10 days treatment course. Axillary abscesses may have hydradenitis per wound care, will need outpatient follow up with derm, discuss with patient she is aware.       Consults:   Consults (From admission, onward)        Status Ordering Provider     Inpatient consult to ENT  Once     Provider:  (Not yet assigned)    MELODIE Silveira new Assessment & Plan notes have been filed under this hospital service since the last note was generated.  Service: Hospital Medicine    Final Active Diagnoses:    Diagnosis Date Noted POA    PRINCIPAL PROBLEM:  Facial cellulitis [L03.211] 09/30/2018 Yes    Hydradenitis [L73.2] 10/02/2018 Yes    Hypokalemia [E87.6] 10/01/2018 No    PCOS (polycystic ovarian syndrome) [E28.2] 09/30/2018 Yes    Hx of gastric bypass [Z98.84] 09/30/2018 Not Applicable    Cellulitis diffuse, face [L03.211] 09/30/2018 Yes      Problems Resolved During this Admission:       Discharged Condition: stable    Disposition: Home or Self Care    Follow Up:  Follow-up Information     Go to [facility].    Specialty:  Emergency Medicine  Why:  If symptoms worsen  Contact information:  [address] [facility] [address] [phone]               Patient Instructions:      Diet Adult Regular     Notify your health care provider if you experience any of the following:  severe uncontrolled pain     Notify your health care provider if you experience any of the following:  redness, tenderness, or signs of infection (pain, swelling, redness, odor or green/yellow discharge around incision site)     Notify your health care provider if you  experience any of the following:  persistent nausea and vomiting or diarrhea     Activity as tolerated       Significant Diagnostic Studies: Labs:   CMP   Recent Labs   Lab  10/02/18   0341   NA  140   K  3.5   CL  104   CO2  27   GLU  79   BUN  3*   CREATININE  0.7   CALCIUM  8.9   ANIONGAP  9   ESTGFRAFRICA  >60.0   EGFRNONAA  >60.0   , CBC   Recent Labs   Lab  10/02/18   0341   WBC  8.77   HGB  13.0   HCT  38.0   PLT  272       Microbiology: Wound Culture:  Methicillin resistant staphylococcus aureus       CULTURE, AEROBIC  (SPECIFY SOURCE)     Clindamycin <=0.5  Sensitive     Erythromycin <=0.5  Sensitive     Oxacillin >2  Resistant     Penicillin 8  Resistant     Tetracycline <=4  Sensitive     Trimeth/Sulfa 2/38  Sensitive     Vancomycin 2  Sensitive            Linear View         Pending Diagnostic Studies:     None         Medications:  Reconciled Home Medications:      Medication List      START taking these medications    Saccharomyces boulardii 250 mg capsule  Commonly known as:  FLORASTOR  Take 1 capsule (250 mg total) by mouth 2 (two) times daily. Take for 2 weeks, while on oral antibiotic        CHANGE how you take these medications    HYDROcodone-acetaminophen 5-325 mg per tablet  Commonly known as:  NORCO  Take 1 tablet by mouth every 8 (eight) hours as needed for Pain (Severe Pain).  What changed:    · when to take this  · reasons to take this     sulfamethoxazole-trimethoprim 800-160mg 800-160 mg Tab  Commonly known as:  BACTRIM DS  Take 2 tablets by mouth 2 (two) times daily. for 10 days  What changed:  how much to take        STOP taking these medications    clindamycin 150 MG capsule  Commonly known as:  CLEOCIN            Indwelling Lines/Drains at time of discharge:   Lines/Drains/Airways          None          Time spent on the discharge of patient: >30 minutes  Patient was seen and examined on the date of discharge and determined to be suitable for discharge.         Tristan Kelly,  MD  Department of Hospital Medicine  [facility]

## 2018-10-05 LAB — BACTERIA BLD CULT: NORMAL

## 2019-07-03 ENCOUNTER — OFFICE VISIT (OUTPATIENT)
Dept: OBSTETRICS AND GYNECOLOGY | Facility: CLINIC | Age: 29
End: 2019-07-03
Payer: COMMERCIAL

## 2019-07-03 VITALS
HEIGHT: 65 IN | SYSTOLIC BLOOD PRESSURE: 127 MMHG | DIASTOLIC BLOOD PRESSURE: 81 MMHG | BODY MASS INDEX: 33.06 KG/M2 | WEIGHT: 198.44 LBS

## 2019-07-03 DIAGNOSIS — N89.8 VAGINAL DISCHARGE: ICD-10-CM

## 2019-07-03 DIAGNOSIS — B97.7 HPV IN FEMALE: Primary | ICD-10-CM

## 2019-07-03 PROCEDURE — 87491 CHLMYD TRACH DNA AMP PROBE: CPT

## 2019-07-03 PROCEDURE — 87510 GARDNER VAG DNA DIR PROBE: CPT

## 2019-07-03 PROCEDURE — 99203 OFFICE O/P NEW LOW 30 MIN: CPT | Mod: S$GLB,,, | Performed by: OBSTETRICS & GYNECOLOGY

## 2019-07-03 PROCEDURE — 3008F PR BODY MASS INDEX (BMI) DOCUMENTED: ICD-10-PCS | Mod: CPTII,S$GLB,, | Performed by: OBSTETRICS & GYNECOLOGY

## 2019-07-03 PROCEDURE — 87480 CANDIDA DNA DIR PROBE: CPT

## 2019-07-03 PROCEDURE — 3008F BODY MASS INDEX DOCD: CPT | Mod: CPTII,S$GLB,, | Performed by: OBSTETRICS & GYNECOLOGY

## 2019-07-03 PROCEDURE — 99203 PR OFFICE/OUTPT VISIT, NEW, LEVL III, 30-44 MIN: ICD-10-PCS | Mod: S$GLB,,, | Performed by: OBSTETRICS & GYNECOLOGY

## 2019-07-03 NOTE — PROGRESS NOTES
Subjective:       Patient ID: Eboni Betancourt is a 29 y.o. female.    Chief Complaint:  Gynecologic Exam    History of Present Illness:  HPI  28 y/o female presents for consult for HPV on recent pap. Records reviewed, noting NILM pap +HPV (typing not done). She denies history of abnormal paps or HPV prior to that. She is unsure about history of gardasil vaccine.   She reports that she was also recently treated for BV but symptoms have recurred. She is requesting STD screening as well. She has lost about 160 lbs from gastric bypass. Previously was having very irregular menses and was told she had PCOS. Reports cycles are regular, occurring monthly now.     GYN & OB History  Patient's last menstrual period was 06/27/2019.   Date of Last Pap: No result found    OB History   No data available       Review of Systems  Review of Systems   Constitutional: Negative for chills, diaphoresis, fatigue and fever.   Respiratory: Negative for cough and shortness of breath.    Cardiovascular: Negative for chest pain and palpitations.   Gastrointestinal: Negative for abdominal pain, constipation, diarrhea, nausea and vomiting.   Genitourinary: Positive for vaginal discharge and vaginal odor. Negative for dysmenorrhea, dyspareunia, dysuria, frequency, menorrhagia, menstrual problem, pelvic pain, vaginal bleeding and vaginal pain.   Musculoskeletal: Negative for back pain and myalgias.   Integumentary:  Negative for rash and acne.   Neurological: Negative for headaches.   Psychiatric/Behavioral: Negative for depression. The patient is not nervous/anxious.            Objective:    Physical Exam:   Constitutional: She is oriented to person, place, and time. She appears well-developed and well-nourished. No distress.    HENT:   Head: Normocephalic and atraumatic.    Eyes: EOM are normal.    Neck: Normal range of motion.     Pulmonary/Chest: Effort normal.          Genitourinary:   Genitourinary Comments: Normal external female  genitalia; vagina rugated, normal, white vaginal discharge; cervix normal, no masses.           Musculoskeletal: Normal range of motion and moves all extremeties.       Neurological: She is alert and oriented to person, place, and time.    Skin: Skin is warm. No rash noted.    Psychiatric: She has a normal mood and affect. Her behavior is normal. Judgment and thought content normal.          Assessment:        1. HPV in female    2. Vaginal discharge              Plan:      Eboni was seen today for gynecologic exam.    Diagnoses and all orders for this visit:    HPV in female  - Pap results reviewed. Pap NILM, HPV+ but typing not done. Patient given the option of repeat HPV (for high risk typing) now, vs cotesting in 12 months. Given first abnormal result, will proceed with cotesting in 12 months.     Vaginal discharge  -     Vaginosis Screen by DNA Probe  -     C. trachomatis/N. gonorrhoeae by AMP DNA  - Discussed alternative treatments for BV - patient desires solosec.     Orders Placed This Encounter   Procedures    Vaginosis Screen by DNA Probe    C. trachomatis/N. gonorrhoeae by AMP DNA       Follow up in about 1 year (around 7/3/2020).

## 2019-07-03 NOTE — TELEPHONE ENCOUNTER
----- Message from Sheridan Griffin sent at 7/3/2019  1:13 PM CDT -----  Contact: Self            Name of Who is Calling: JANE GOODWIN [[id number]]      What is the request in detail: Pt states she was at an office visit today and was advised that she would get a prescription sent to her pharmacy. Pt states the pharmacy has not received a prescription. Please send to [phone]. Please contact to further discuss and advise.        Can the clinic reply by [facility]: N      What Number to Call Back if not in [facility]: [phone]

## 2019-07-04 ENCOUNTER — HOSPITAL ENCOUNTER (EMERGENCY)
Facility: HOSPITAL | Age: 29
Discharge: HOME | End: 2019-07-04
Attending: EMERGENCY MEDICINE
Payer: COMMERCIAL

## 2019-07-04 VITALS
OXYGEN SATURATION: 98 % | DIASTOLIC BLOOD PRESSURE: 77 MMHG | TEMPERATURE: 98 F | BODY MASS INDEX: 32.95 KG/M2 | RESPIRATION RATE: 18 BRPM | WEIGHT: 198 LBS | SYSTOLIC BLOOD PRESSURE: 122 MMHG | HEART RATE: 67 BPM

## 2019-07-04 DIAGNOSIS — N72 CERVICITIS: Primary | ICD-10-CM

## 2019-07-04 LAB
B-HCG UR QL: NEGATIVE
BACTERIA GENITAL QL WET PREP: ABNORMAL
C TRACH DNA SPEC QL NAA+PROBE: NOT DETECTED
CLUE CELLS VAG QL WET PREP: ABNORMAL
CTP QC/QA: YES
FILAMENT FUNGI VAG WET PREP-#/AREA: ABNORMAL
N GONORRHOEA DNA SPEC QL NAA+PROBE: NOT DETECTED
SPECIMEN SOURCE: ABNORMAL
T VAGINALIS GENITAL QL WET PREP: ABNORMAL
WBC #/AREA VAG WET PREP: ABNORMAL
YEAST GENITAL QL WET PREP: ABNORMAL

## 2019-07-04 PROCEDURE — 25000003 PHARM REV CODE 250: Performed by: EMERGENCY MEDICINE

## 2019-07-04 PROCEDURE — 63600175 PHARM REV CODE 636 W HCPCS: Performed by: EMERGENCY MEDICINE

## 2019-07-04 PROCEDURE — 99284 EMERGENCY DEPT VISIT MOD MDM: CPT | Mod: 25

## 2019-07-04 PROCEDURE — 87210 SMEAR WET MOUNT SALINE/INK: CPT

## 2019-07-04 PROCEDURE — 81025 URINE PREGNANCY TEST: CPT | Performed by: EMERGENCY MEDICINE

## 2019-07-04 PROCEDURE — 96372 THER/PROPH/DIAG INJ SC/IM: CPT

## 2019-07-04 RX ORDER — CEFTRIAXONE 250 MG/1
250 INJECTION, POWDER, FOR SOLUTION INTRAMUSCULAR; INTRAVENOUS
Status: COMPLETED | OUTPATIENT
Start: 2019-07-04 | End: 2019-07-04

## 2019-07-04 RX ORDER — AZITHROMYCIN 250 MG/1
1000 TABLET, FILM COATED ORAL
Status: COMPLETED | OUTPATIENT
Start: 2019-07-04 | End: 2019-07-04

## 2019-07-04 RX ADMIN — CEFTRIAXONE SODIUM 250 MG: 250 INJECTION, POWDER, FOR SOLUTION INTRAMUSCULAR; INTRAVENOUS at 08:07

## 2019-07-04 RX ADMIN — AZITHROMYCIN 1000 MG: 250 TABLET, FILM COATED ORAL at 08:07

## 2019-07-04 NOTE — ED PROVIDER NOTES
Encounter Date: 7/4/2019    SCRIBE #1 NOTE: I, Delmer Ricardo, am scribing for, and in the presence of,  Dr. Currie. I have scribed the entire note.       History     Chief Complaint   Patient presents with    Female  Problem     29 year old female presents to ed cc of white vaginal discharge with itching x 3 days. denies vaginal bleeding. states just finished rx for bv     Eboni Betancourt is a 29 y.o. female who  has a past medical history of Abnormal Pap smear of cervix (06/2019), Abscess, and Polycystic ovarian disease.    The patient presents to the ED due to vaginal discharge x 3 days. Patient reports a white discharge with associated itching. She also reports having mild dysuria, described as a burning sensation with urination. Patient denies any fever, abdominal pain, back pain, nausea, vaginal bleeding, or any other symptoms. She reports she was diagnosed with BV and HPV 1 week ago, and was seen by her OBGYN yesterday regarding these issues. She notes that she was swabbed for other potential vaginal infections at that time, but is awaiting her results.    The history is provided by the patient.     Review of patient's allergies indicates:   Allergen Reactions    Penicillins Itching     Past Medical History:   Diagnosis Date    Abnormal Pap smear of cervix 06/2019    Abscess     Polycystic ovarian disease      Past Surgical History:   Procedure Laterality Date    GASTRIC BYPASS      GASTRIC BYPASS       Family History   Problem Relation Age of Onset    Colon cancer Maternal Grandmother     Breast cancer Neg Hx     Ovarian cancer Neg Hx      Social History     Tobacco Use    Smoking status: Never Smoker    Smokeless tobacco: Never Used   Substance Use Topics    Alcohol use: Yes    Drug use: No     Review of Systems   Genitourinary: Positive for dysuria and vaginal discharge.   All other systems reviewed and are negative.    Physical Exam     Initial Vitals [07/04/19 0706]   BP Pulse Resp Temp SpO2    (!) 139/92 79 20 98.4 °F (36.9 °C) 98 %      MAP       --         Physical Exam    Nursing note and vitals reviewed.  Constitutional: She appears well-developed and well-nourished. She is not diaphoretic. No distress.   HENT:   Head: Normocephalic and atraumatic.   Eyes: Conjunctivae and EOM are normal.   Neck: Normal range of motion. Neck supple.   Cardiovascular: Normal rate, regular rhythm and normal heart sounds.   Pulmonary/Chest: Breath sounds normal. No respiratory distress.   Abdominal: Soft. There is no tenderness.   Genitourinary:   Genitourinary Comments: Pelvic exam:  No vulvar or vaginal lesions  No vaginal bleeding  Vaginal vault with moderate thin white, yellow discharge  No CMT   Musculoskeletal: Normal range of motion. She exhibits no edema or tenderness.   Neurological: She is alert and oriented to person, place, and time. She has normal strength.   Skin: Skin is warm and dry. Capillary refill takes less than 2 seconds.         ED Course   Procedures  Labs Reviewed   VAGINAL SCREEN - Abnormal; Notable for the following components:       Result Value    WBC - Vaginal Screen Many (*)     Bacteria - Vaginal Screen Many (*)     All other components within normal limits   POCT URINE PREGNANCY             Medical Decision Making:   Clinical Tests:   Lab Tests: Ordered and Reviewed  ED Management:  29-year-old female presenting with vaginal discharge. Wet prep done here in the ED shows no clue cells, no yeast and no Trichomonas.  There abundant white blood cells bacteria.  Patient had a GC and chlamydia cultures sent by her gynecologist yesterday.  I will presumptively treat her with azithromycin and Rocephin.  She will follow up with a gynecologist as soon as able for recheck and further treatment as warranted.                      Clinical Impression:       ICD-10-CM ICD-9-CM   1. Cervicitis N72 616.0     Disposition:   Disposition: Discharged  Condition: Stable    I, Dr. Jak Currie, personally  performed the services described in this documentation. All medical record entries made by the scribe were at my direction and in my presence. I have reviewed the chart and agree that the record reflects my personal performance and is accurate and complete. Jak Velasquez MD.  8:49 AM 07/04/2019       Jak Velasquez MD  07/04/19 0850

## 2019-07-04 NOTE — ED TRIAGE NOTES
Pt presents to ED today c/o vaginal discharge and vaginal irritation x 3 days. Pt reports being treated for BV last week and completed metronidazole with no relief. Pt was seen by GYN yesterday and cultures were collected, however have not resulted yet.

## 2019-07-05 LAB
BACTERIAL VAGINOSIS DNA: NEGATIVE
CANDIDA GLABRATA DNA: NEGATIVE
CANDIDA KRUSEI DNA: NEGATIVE
CANDIDA RRNA VAG QL PROBE: POSITIVE
T VAGINALIS RRNA GENITAL QL PROBE: NEGATIVE

## 2019-07-07 DIAGNOSIS — B37.31 VAGINAL YEAST INFECTION: Primary | ICD-10-CM

## 2019-07-07 RX ORDER — FLUCONAZOLE 150 MG/1
150 TABLET ORAL ONCE
Qty: 1 TABLET | Refills: 2 | Status: SHIPPED | OUTPATIENT
Start: 2019-07-07 | End: 2019-07-07

## 2019-07-09 ENCOUNTER — TELEPHONE (OUTPATIENT)
Dept: OBSTETRICS AND GYNECOLOGY | Facility: CLINIC | Age: 29
End: 2019-07-09

## 2019-07-09 NOTE — TELEPHONE ENCOUNTER
Left  vm for pt to give the office a call.          Please call patient and let her know that the BV test was negative, but she tested positive for yeast. I sent diflucan to her pharmacy. GC/CT was negative. Thanks

## 2019-11-06 DIAGNOSIS — B37.9 YEAST INFECTION: Primary | ICD-10-CM

## 2019-11-06 RX ORDER — FLUCONAZOLE 150 MG/1
150 TABLET ORAL DAILY
Qty: 1 TABLET | Refills: 0 | Status: SHIPPED | OUTPATIENT
Start: 2019-11-06 | End: 2019-11-07

## 2019-11-06 NOTE — TELEPHONE ENCOUNTER
----- Message from Mami Gomez sent at 11/6/2019  2:54 PM CST -----  Contact: JANE GOODWIN   Can the clinic reply in [facility]: no      Please refill the medication(s) listed below. Please call the patient when the prescription(s) is ready for  at this phone number   [phone]      Medication #1 diflucan    Medication #2       Preferred Pharmacy: [address] ([place], [place] [address]